# Patient Record
Sex: FEMALE | Race: WHITE
[De-identification: names, ages, dates, MRNs, and addresses within clinical notes are randomized per-mention and may not be internally consistent; named-entity substitution may affect disease eponyms.]

---

## 2017-05-28 ENCOUNTER — HOSPITAL ENCOUNTER (EMERGENCY)
Dept: HOSPITAL 33 - ED | Age: 77
Discharge: HOME | End: 2017-05-28
Payer: MEDICARE

## 2017-05-28 VITALS — DIASTOLIC BLOOD PRESSURE: 82 MMHG | HEART RATE: 89 BPM | SYSTOLIC BLOOD PRESSURE: 137 MMHG

## 2017-05-28 VITALS — OXYGEN SATURATION: 98 %

## 2017-05-28 DIAGNOSIS — Z79.899: ICD-10-CM

## 2017-05-28 DIAGNOSIS — R06.09: ICD-10-CM

## 2017-05-28 DIAGNOSIS — R05: ICD-10-CM

## 2017-05-28 DIAGNOSIS — J45.909: ICD-10-CM

## 2017-05-28 DIAGNOSIS — J40: Primary | ICD-10-CM

## 2017-05-28 DIAGNOSIS — R06.2: ICD-10-CM

## 2017-05-28 DIAGNOSIS — J43.9: ICD-10-CM

## 2017-05-28 PROCEDURE — 71020: CPT

## 2017-05-28 PROCEDURE — 99284 EMERGENCY DEPT VISIT MOD MDM: CPT

## 2017-05-28 NOTE — XRAY
Indication: Cough.



Comparison: May 15, 2016.



PA/lateral chest again hyperinflated with right base pleural thickening and

postsurgical changes in the right chest and epigastrium.  Remaining lungs

clear.  Heart is not enlarged.  Bony thorax intact again with mild osteopenia,

degenerative changes, and scoliosis.



Impression: Stable nonacute chest with chronic features.

## 2017-05-28 NOTE — ERPHSYRPT
- History of Present Illness


Time Seen by Provider: 05/28/17 14:10


Source: patient, family


Exam Limitations: no limitations


Patient Subjective Stated Complaint: PT REPORTS HAVING THE FLU THIS WEEK-STATES 

IT HAS WENT INTO HER LUNGS-REPORTS PRODUCTIVE COUGH WITH [employer]UM-REPORTS 

FEVER AT HOME A FEW DAYS AGO


Triage Nursing Assessment: PT PALE WARM ET DRY-ALERT-SPEAKING IN COMPLETE 

SENTENCES WITHE ASE-NO RETRACTIONS NOTED-PERSISTANT COUGH NOTED DURING TRIAGE-

LUNGS DIMINISHED


Physician History: 





The patient is 77-year-old female with family complaining that one week ago she 

had flulike symptoms that included a sore throat cough and fever.  Now for the 

last 2 days her cough has increased especially at night.  She states that every 

time she has a problem with a flulike illness it ends up settling in her lungs.

  Her past medical history is significant for asthma, COPD, hypertension, and 

diabetes.


Timing/Duration: week(s) (1)


Cough Quality/Degree: moderate, productive cough, sputum


Possible Cause: frequent episodes


Modifying Factors: Improves With: albuterol inhaler, albuterol nebulizer, 

coughing


Associated Symptoms: cough, shortness of breath


Allergies/Adverse Reactions: 








Iodinated Contrast Media - Oral and Allergy (Intermediate, Verified 05/28/17 13:

48)


 Hives


 hives, rash, diff breathing 


belladonna alkaloids [From B & O 16-A Supprette] Allergy (Mild, Verified 05/28/ 17 13:48)


 CHILD ALLERGY


cephalexin monohydrate [From Keflex] Allergy (Mild, Verified 05/28/17 13:48)


 Hives


furosemide [From Lasix] Allergy (Mild, Verified 05/28/17 13:48)


 HEART ARTHYM


prochlorperazine edisylate [From Compazine] Allergy (Mild, Verified 05/28/17 13:

48)


 HALLUCINATIONS


prochlorperazine maleate [From Compazine] Allergy (Mild, Verified 05/28/17 13:48

)


 HALLUCINATIONS


Sulfa (Sulfonamide Antibiotics) [Sulfa(Sulfonamide Antibiotics)] Allergy (Mild, 

Verified 05/28/17 13:48)


 Hives


codeine [Codeine] Adverse Reaction (Mild, Verified 05/28/17 13:48)


 Headache


hydrocodone bitartrate [From Vicodin] Adverse Reaction (Mild, Verified 05/28/17 

13:48)


 Headache


ketorolac tromethamine [From Toradol] Adverse Reaction (Mild, Verified 05/28/17 

13:48)


 Vomiting


metformin HCl [From Glucophage] Adverse Reaction (Mild, Verified 05/28/17 13:48)


 Vomiting


naproxen [From Naprosyn] Adverse Reaction (Mild, Verified 05/28/17 13:48)


 Headache


opium *RETIRED-04/24/13 [From B & O 16-A Supprette] Adverse Reaction (Mild, 

Verified 05/28/17 13:48)


 DON'T LIKE THEM


phenytoin sodium [From Dilantin] Adverse Reaction (Mild, Verified 05/28/17 13:48

)


 FEEL DRUNK


phenytoin sodium extended [From Dilantin] Adverse Reaction (Mild, Verified 05/28 /17 13:48)


 FEEL DRUNK


fluoxetine HCl [From Prozac] Adverse Reaction (Verified 05/28/17 13:48)


 


Lactobacillus acidophilus [From Acidophilus] Adverse Reaction (Verified 05/28/ 17 13:48)


 


diuretics Allergy (Intermediate, Uncoded 05/28/17 13:48)


 HEART ARRHYTHMIAS


 pt states she is allergic to all diuretics. pt has cardiac arrhythmias 


influenza vaccine Adverse Reaction (Uncoded 05/28/17 13:48)


 





Home Medications: 








Levothyroxine Sodium 50 Mcg*** [Synthroid 50 Mcg***] 25 mcg PO DAILY 07/22/12 [

History]


Lisinopril 5 mg PO DAILY 07/22/12 [History]


Omeprazole 20 MG [Prilosec 20 mg] 20 mg PO DAILY 07/22/12 [History]


Insulin Detemir [Levemir] 45 unit SQ 1900 09/07/12 [History]


Carvedilol 6.25 mg*** [Coreg 6.25 MG***] 12.5 mg PO BID 08/19/14 [History]


Potassium Chloride [K-Dur] 10 meq PO QID 08/19/14 [History]


Albuterol Sulfate [Proair Hfa] 0 gm IH UD 05/15/16 [History]





Hx Tetanus, Diphtheria Vaccination/Date Given: No


Hx Influenza Vaccination/Date Given: No


Hx Pneumococcal Vaccination/Date Given: No


Immunizations Up to Date: Yes





- Review of Systems


Constitutional: No Fever, No Chills


Eyes: No Symptoms


Ears, Nose, & Throat: No Symptoms


Respiratory: Cough, Dyspnea on Exertion (CLEMONS), Wheezing


Cardiac: No Chest Pain, No Edema, No Syncope


Abdominal/Gastrointestinal: No Abdominal Pain, No Nausea, No Vomiting, No 

Diarrhea


Genitourinary Symptoms: No Dysuria


Musculoskeletal: No Back Pain, No Neck Pain


Skin: No Rash


Neurological: No Dizziness, No Focal Weakness, No Sensory Changes


Psychological: No Symptoms


Endocrine: No Symptoms


Hematologic/Lymphatic: No Symptoms


Immunological/Allergic: No Symptoms


All Other Systems: Reviewed and Negative





- Past Medical History


Pertinent Past Medical History: Yes


Neurological History: No Pertinent History


ENT History: No Pertinent History


Cardiac History: Congestive Heart Failure, Hypertension


Respiratory History: Asthma, Bronchitis, Emphysema


Endocrine Medical History: Diabetes Type II, Hypothyroidism


Musculoskeletal History: Arthritis, Osteoarthritis


GI Medical History: GERD


 History: No Pertinent History


Psycho-Social History: Depression


Female Reproductive Disorders: Breast Cancer, Cervical Cancer


Other Medical History: CANCERS ALL IN REMISSION





- Past Surgical History


Past Surgical History: Yes


Neuro Surgical History: No Pertinent History


Cardiac: Cardiac Catheterization


Respiratory: Lobectomy


Gastrointestinal: Appendectomy, Cholecystectomy, Hemorrhoidectomy


Genitourinary: No Pertinent History


Musculoskeletal: Orthopedic Surgery


Female Surgical History: Hysterectomy, Tubal Ligation, Other


Other Surgical History: MASTECTOMY RT  BREAST,BREAST CA,LUNG RT MIDDLE LOBE 

REMOVED,NISSON FLUNDOPLASTY,KNEE BACL SURG, shoulder surgery





- Social History


Smoking Status: Former smoker


How long have you smoked: 5


Exposure to second hand smoke: No


Drug Use: none


Patient Lives Alone: Yes





- Female History


Hx Pregnant Now: No





- Nursing Vital Signs


Nursing Vital Signs: 


 Initial Vital Signs











Temperature                    97.7 F


 


Temperature Source             Oral


 


Pulse Rate                     89


 


Respiratory Rate               20


 


Blood Pressure [Left Arm]      137/82


 


Pain Intensity                 0

















- Physical Exam


General Appearance: no apparent distress, alert


Eye Exam: PERRL/EOMI, eyes nml inspection


Ears, Nose, Throat Exam: normal ENT inspection


Neck Exam: normal inspection, non-tender, supple, full range of motion


Respiratory Exam: normal breath sounds, lungs clear, No respiratory distress, 

No prolonged expirations, No rhonchi, No wheezing


Cardiovascular Exam: regular rate/rhythm, normal heart sounds


Gastrointestinal/Abdomen Exam: soft, No tenderness


Pelvic Exam: not done


Rectal Exam: not done


Back Exam: normal inspection, No CVA tenderness, No vertebral tenderness


Extremity Exam: normal inspection, normal range of motion


Neurologic Exam: alert, oriented x 3, cooperative, normal mood/affect, 

sensation nml, No motor deficits


Skin Exam: normal color, warm, dry, No rash


Lymphatic Exam: No adenopathy


**SpO2 Interpretation**: normal


SpO2: 98


Oxygen Delivery: Room Air





- Radiology Exams


  ** Chest


X-ray Interpretation: Interpreted by me, Negative (non acute CXR, unchanged 

comp CXR 5/15/16)


Ordered Tests: 


 Active Orders 24 hr











 Category Date Time Status


 


 CHEST 2 VIEWS (PA AND LAT) Stat Exams  05/28/17 14:14 Taken














- Progress


Progress: unchanged


Blood Culture(s) Obtained: No


Antibiotics given: Yes


Counseled pt/family regarding: diagnosis, rad results





- Departure


Time of Disposition: 15:01


Departure Disposition: Home


Clinical Impression: 


 Bronchitis





Condition: Stable


Critical Care Time: No


Additional Instructions: 


You have bronchitis.  The chest x-ray did not show pneumonia.  Take prednisone 

60 mg daily for 5 days.  Take levofloxacin 500 mg for 7 days.  Continue to take 

your breathing treatments as needed.  Follow-up in one to 2 days.


Prescriptions: 


Levofloxacin*** [Levofloxacin 500 MG Tablet***] 500 mg PO QAM #7 tablet


Prednisone 10 mg*** [Deltasone 10 mg***] 60 mg PO UD #30 tablet

## 2017-09-02 ENCOUNTER — HOSPITAL ENCOUNTER (EMERGENCY)
Dept: HOSPITAL 33 - ED | Age: 77
Discharge: HOME | End: 2017-09-02
Payer: MEDICARE

## 2017-09-02 VITALS — OXYGEN SATURATION: 98 % | HEART RATE: 68 BPM | SYSTOLIC BLOOD PRESSURE: 163 MMHG | DIASTOLIC BLOOD PRESSURE: 80 MMHG

## 2017-09-02 DIAGNOSIS — I10: Primary | ICD-10-CM

## 2017-09-02 LAB
ALBUMIN SERPL-MCNC: 3.1 G/DL (ref 3.4–5)
ALP SERPL-CCNC: 80 U/L (ref 46–116)
ALT SERPL-CCNC: 21 U/L (ref 12–78)
ANION GAP SERPL CALC-SCNC: 10.2 MEQ/L (ref 5–15)
AST SERPL QL: 13 U/L (ref 15–37)
BASOPHILS NFR BLD AUTO: 0.7 % (ref 0–0.4)
BILIRUB BLD-MCNC: 0.3 MG/DL (ref 0.2–1)
BUN SERPL-MCNC: 11 MG/DL (ref 9–20)
CHLORIDE SERPL-SCNC: 103 MEQ/L (ref 98–107)
CO2 SERPL-SCNC: 31.8 MEQ/L (ref 21–32)
GLUCOSE SERPL-MCNC: 209 MG/DL (ref 70–110)
MCH RBC QN AUTO: 28.7 PG (ref 26–32)
NEUTROPHILS NFR BLD AUTO: 45.5 % (ref 36–66)
PLATELET # BLD AUTO: 195 K/MM3 (ref 150–450)
POTASSIUM SERPLBLD-SCNC: 4 MEQ/L (ref 3.5–5.1)
PROT SERPL-MCNC: 6.2 GM/DL (ref 6.4–8.2)
RBC # BLD AUTO: 4.88 M/MM3 (ref 4.1–5.4)
SODIUM SERPL-SCNC: 141 MEQ/L (ref 136–145)
WBC # BLD AUTO: 7.3 K/MM3 (ref 4–10.5)

## 2017-09-02 PROCEDURE — 80053 COMPREHEN METABOLIC PANEL: CPT

## 2017-09-02 PROCEDURE — 99283 EMERGENCY DEPT VISIT LOW MDM: CPT

## 2017-09-02 PROCEDURE — 85025 COMPLETE CBC W/AUTO DIFF WBC: CPT

## 2017-09-02 PROCEDURE — 36415 COLL VENOUS BLD VENIPUNCTURE: CPT

## 2017-09-02 NOTE — ERPHSYRPT
- History of Present Illness


Time Seen by Provider: 09/02/17 15:39


Source: patient, family (son)


Patient Subjective Stated Complaint: PT REPORTS BP IS HIGH-STATES THAT DR SINCLAIR RECENTLY CHANGED HER BP MED-DENIES HEADACHE-DENIES NUMBNESS OR TINLGING-

REPORTS RECENT SURGERY TO SHOULDER


Triage Nursing Assessment: PT PINK WARM ET DRY-ALERT-PUPILS REACTIVE-ANSWERING 

ALL QUESTIONS CORRECTLY-NO UNUSUAL PAIN OR SENSATION


Physician History: 





CC: high blood pressure


Hx: 78 y/o patient of Dr Alfred/Rob/Larry. She had left shoulder 

surgery 10 days ago. Has done very well. She is taking ultracet and meperidine 

for post op pain as she has several other allergies. She noted higher BP 

yesterday. Called Dr Sinclair and was told to increase amlodipine from 2.5mg to 

5mg daily. She has had some headache. Some increased shoulder pain. No fever or 

chills. She called [facility] and was told to come to ER for BP check. No chest or abd 

pain. No swelling. No N/T/W.





Timing/Duration: yesterday


Severity: moderate


Allergies/Adverse Reactions: 








doxycycline Allergy (Intermediate, Verified 09/02/17 15:37)


 Swelling


Iodinated Contrast- Oral and IV Dye Allergy (Intermediate, Verified 09/02/17 15:

37)


 Hives


 hives, rash, diff breathing 


belladonna alkaloids [From B & O 16-A Supprette] Allergy (Mild, Verified 09/02/ 17 15:37)


 CHILD ALLERGY


cephalexin monohydrate [From Keflex] Allergy (Mild, Verified 09/02/17 15:37)


 Hives


furosemide [From Lasix] Allergy (Mild, Verified 09/02/17 15:37)


 HEART ARTHYM


prochlorperazine edisylate [From Compazine] Allergy (Mild, Verified 09/02/17 15:

37)


 HALLUCINATIONS


prochlorperazine maleate [From Compazine] Allergy (Mild, Verified 09/02/17 15:37

)


 HALLUCINATIONS


Sulfa (Sulfonamide Antibiotics) [Sulfa(Sulfonamide Antibiotics)] Allergy (Mild, 

Verified 09/02/17 15:37)


 Hives


codeine [Codeine] Adverse Reaction (Mild, Verified 09/02/17 15:37)


 Headache


hydrocodone bitartrate [From Vicodin] Adverse Reaction (Mild, Verified 09/02/17 

15:37)


 Headache


ketorolac tromethamine [From Toradol] Adverse Reaction (Mild, Verified 09/02/17 

15:37)


 Vomiting


metformin HCl [From Glucophage] Adverse Reaction (Mild, Verified 09/02/17 15:37)


 Vomiting


naproxen [From Naprosyn] Adverse Reaction (Mild, Verified 09/02/17 15:37)


 Headache


opium *RETIRED-04/24/13 [From B & O 16-A Supprette] Adverse Reaction (Mild, 

Verified 09/02/17 15:37)


 DON'T LIKE THEM


phenytoin sodium [From Dilantin] Adverse Reaction (Mild, Verified 09/02/17 15:37

)


 FEEL DRUNK


phenytoin sodium extended [From Dilantin] Adverse Reaction (Mild, Verified 09/02 /17 15:37)


 FEEL DRUNK


fluoxetine HCl [From Prozac] Adverse Reaction (Verified 09/02/17 15:37)


 


Lactobacillus acidophilus [From Acidophilus] Adverse Reaction (Verified 09/02/ 17 15:37)


 


diuretics Allergy (Intermediate, Uncoded 09/02/17 15:37)


 HEART ARRHYTHMIAS


 pt states she is allergic to all diuretics. pt has cardiac arrhythmias 


influenza vaccine Adverse Reaction (Uncoded 09/02/17 15:37)


 





Home Medications: 








Albuterol Sulfate [Ventolin Hfa] 8 gm IH UD 09/02/17 [History]


Amlodipine Besylate 5 mg*** [Norvasc 5 mg***] 5 mg PO DAILY 09/02/17 [History]


Carvedilol 12.5 mg*** [Coreg 12.5 mg***] 12.5 mg PO DAILY 09/02/17 [History]


Fluoxetine HCl 20 mg*** [Prozac 20 MG***] 20 mg PO DAILY 09/02/17 [History]


Fluticasone/Salmeterol [Advair 250-50 Diskus] 1 each IH BID 09/02/17 [History]


Glimepiride 2 mg*** [Amaryl 2 MG***] 2 mg PO DAILY 09/02/17 [History]


Insulin Detemir [Levemir] 45 unit SQ UD 09/02/17 [History]


Levothyroxine Sodium 25 Mcg*** [Synthroid 25 Mcg***] 25 mcg PO DAILY 09/02/17 [

History]


Lisinopril 10 mg*** [Zestril 10 MG***] 10 mg PO BID 09/02/17 [History]


Loratadine 10 mg*** [Claritin 10 mg***] 10 mg PO DAILY 09/02/17 [History]


Lutein 10 mg PO DAILY 09/02/17 [History]


Meperidine HCl 50 mg*** [Demerol 50 MG***] 50 mg PO UD 09/02/17 [History]


Metolazone 2.5 mg** [Zaroxolyn 2.5 MG**] 2.5 mg PO UD 09/02/17 [History]


Omeprazole 20 MG [Prilosec 20 mg] 20 mg PO DAILY 09/02/17 [History]


Potassium Chloride 10 Meq Tab* [Klor Con 10 MEQ***] 10 meq PO BID 09/02/17 [

History]


Tramadol HCl/Acetaminophen [Tramadol-Acetaminophn 37.5-325] 1 each PO UD 09/02/ 17 [History]





Hx Tetanus, Diphtheria Vaccination/Date Given: No


Hx Influenza Vaccination/Date Given: No


Hx Pneumococcal Vaccination/Date Given: No


Immunizations Up to Date: Yes





- Review of Systems


Constitutional: No Fever, No Chills


Eyes: No Symptoms


Ears, Nose, & Throat: No Symptoms


Respiratory: No Cough, No Dyspnea


Cardiac: No Chest Pain


Abdominal/Gastrointestinal: No Abdominal Pain, No Nausea, No Vomiting


Skin: No Rash


Neurological: Headache, No Focal Weakness, No Parasthesia


All Other Systems: Reviewed and Negative





- Past Medical History


Pertinent Past Medical History: Yes


Neurological History: No Pertinent History


ENT History: No Pertinent History


Cardiac History: Congestive Heart Failure, Hypertension


Respiratory History: Asthma, Bronchitis, Emphysema


Endocrine Medical History: Diabetes Type II, Hypothyroidism


Musculoskeletal History: Arthritis, Osteoarthritis


GI Medical History: GERD


 History: No Pertinent History


Psycho-Social History: Depression


Female Reproductive Disorders: Breast Cancer, Cervical Cancer


Other Medical History: CANCERS ALL IN REMISSION





- Past Surgical History


Past Surgical History: Yes


Neuro Surgical History: No Pertinent History


Cardiac: Cardiac Catheterization


Respiratory: Lobectomy


Gastrointestinal: Appendectomy, Cholecystectomy, Hemorrhoidectomy


Genitourinary: No Pertinent History


Musculoskeletal: Orthopedic Surgery


Female Surgical History: Hysterectomy, Tubal Ligation, Other


Other Surgical History: MASTECTOMY RT  BREAST,BREAST CA,LUNG RT MIDDLE LOBE 

REMOVED,NISSON FLUNDOPLASTY,KNEE BACL SURG, shoulder surgery





- Social History


Smoking Status: Former smoker


How long have you smoked: 5


Exposure to second hand smoke: No


Drug Use: none


Patient Lives Alone: Yes





- Female History


Hx Pregnant Now: No





- Nursing Vital Signs


Nursing Vital Signs: 


 Initial Vital Signs











Temperature  97.2 F   09/02/17 15:38


 


Pulse Rate  69   09/02/17 15:38


 


Respiratory Rate  18   09/02/17 15:38


 


Blood Pressure  188/82   09/02/17 15:38


 


O2 Sat by Pulse Oximetry  96   09/02/17 15:38








 Pain Scale











Pain Intensity                 7

















- Physical Exam


General Appearance: alert


Eye Exam: PERRL/EOMI


Ears, Nose, Throat Exam: moist mucous membranes


Neck Exam: normal inspection, non-tender, supple


Respiratory Exam: normal breath sounds


Cardiovascular Exam: regular rate/rhythm


Gastrointestinal/Abdomen Exam: soft, No tenderness, No distention


Extremity Exam: normal inspection, other (left shoulder has some bruising but 

appears well without erythema.)


Neurologic Exam: alert, oriented x 3, cooperative


Skin Exam: warm, dry


**SpO2 Interpretation**: normal


SpO2: 96


Oxygen Delivery: Room Air





- Course


Nursing assessment & vital signs reviewed: Yes


Ordered Tests: 


 Active Orders 24 hr











 Category Date Time Status


 


 CBC W DIFF Stat Lab  09/02/17 16:06 Completed


 


 CMP Stat Lab  09/02/17 16:06 Completed








Medication Summary














Discontinued Medications














Generic Name Dose Route Start Last Admin





  Trade Name Freq  PRN Reason Stop Dose Admin


 


Hydroxyzine HCl  25 mg  09/02/17 15:51  09/02/17 15:55





  Atarax 25 Mg***  PO  09/02/17 15:52  25 mg





  STAT ONE   Administration


 


Hydroxyzine HCl  Confirm  09/02/17 15:54  





  Atarax 25 Mg***  Administered  09/02/17 15:55  





  Dose   





  25 mg   





  .ROUTE   





  .STK-MED ONE   











Lab/Rad Data: 


 Laboratory Result Diagrams





 09/02/17 16:06 





 09/02/17 16:06 





 Laboratory Results











  09/02/17 09/02/17 Range/Units





  16:06 16:06 


 


WBC   7.3  (4.0-10.5)  K/mm3


 


RBC   4.88  (4.1-5.4)  M/mm3


 


Hgb   14.0  (12.0-16.0)  gm/dl


 


Hct   43.0  (35-47)  %


 


MCV   88.1  ()  fl


 


MCH   28.7  (26-32)  pg


 


MCHC   32.6  (32-36)  g/dl


 


RDW   14.2 H  (11.5-14.0)  %


 


Plt Count   195  (150-450)  K/mm3


 


MPV   11.4 H  (6-9.5)  fl


 


Gran %   45.5  (36.0-66.0)  %


 


Lymphocytes %   42.7  (24.0-44.0)  %


 


Monocytes %   7.0  (0.0-12.0)  %


 


Eosinophils %   4.1  (0.00-5.0)  %


 


Basophils %   0.7  (0.0-0.4)  %


 


Basophils #   0.05  (0-0.4)  


 


Sodium  141   (136-145)  mEq/L


 


Potassium  4.0   (3.5-5.1)  mEq/L


 


Chloride  103   ()  mEq/L


 


Carbon Dioxide  31.8   (21-32)  mEq/L


 


Anion Gap  10.2   (5-15)  MEQ/L


 


BUN  11   (9-20)  mg/dL


 


Creatinine  0.93   (0.55-1.30)  mg/dl


 


Estimated GFR  > 60   ML/MIN


 


Glucose  209 H   ()  MG/DL


 


Calcium  9.1   (8.5-10.1)  mg/dL


 


Total Bilirubin  0.30   (0.2-1.0)  mg/dL


 


AST  13 L   (15-37)  U/L


 


ALT  21   (12-78)  U/L


 


Alkaline Phosphatase  80   ()  U/L


 


Serum Total Protein  6.2 L   (6.4-8.2)  gm/dL


 


Albumin  3.1 L   (3.4-5.0)  g/dL














- Progress


Progress Note: 





09/02/17 16:39


BP improving. Creat wnl. She feels about same or some better. Will release with 

instr.





Counseled pt/family regarding: lab results, diagnosis, need for follow-up





- Departure


Time of Disposition: 16:40


Departure Disposition: Home


Clinical Impression: 


 Hypertension





Condition: Stable


Critical Care Time: No


Referrals: 


CLAUDIA BERGMAN [Primary Care Provider] - 


Instructions:  High Blood Pressure


Additional Instructions: 


Continue increased dose of norvasc of 5mg daily.


No driving today.


Follow up next week with Dr Sinclair/Tima.


Return for problems or concerns.

## 2017-09-30 ENCOUNTER — HOSPITAL ENCOUNTER (EMERGENCY)
Dept: HOSPITAL 33 - ED | Age: 77
Discharge: HOME | End: 2017-09-30
Payer: MEDICARE

## 2017-09-30 VITALS — HEART RATE: 61 BPM | SYSTOLIC BLOOD PRESSURE: 143 MMHG | DIASTOLIC BLOOD PRESSURE: 66 MMHG

## 2017-09-30 VITALS — OXYGEN SATURATION: 97 %

## 2017-09-30 DIAGNOSIS — I10: Primary | ICD-10-CM

## 2017-09-30 DIAGNOSIS — E11.9: ICD-10-CM

## 2017-09-30 LAB
ANION GAP SERPL CALC-SCNC: 10.6 MEQ/L (ref 5–15)
BACTERIA UR CULT: NO
BUN SERPL-MCNC: 10 MG/DL (ref 9–20)
CHLORIDE SERPL-SCNC: 105 MEQ/L (ref 98–107)
CO2 SERPL-SCNC: 28.1 MEQ/L (ref 21–32)
COLLECTION TYPE: (no result)
COMPLETE URINE MICROSCOPIC?: NO
GLUCOSE SERPL-MCNC: 185 MG/DL (ref 70–110)
GLUCOSE UR-MCNC: NEGATIVE MG/DL
POTASSIUM SERPLBLD-SCNC: 3.7 MEQ/L (ref 3.5–5.1)
SODIUM SERPL-SCNC: 140 MEQ/L (ref 136–145)

## 2017-09-30 PROCEDURE — 36415 COLL VENOUS BLD VENIPUNCTURE: CPT

## 2017-09-30 PROCEDURE — 80048 BASIC METABOLIC PNL TOTAL CA: CPT

## 2017-09-30 PROCEDURE — 81002 URINALYSIS NONAUTO W/O SCOPE: CPT

## 2017-09-30 PROCEDURE — 99282 EMERGENCY DEPT VISIT SF MDM: CPT

## 2017-09-30 NOTE — ERPHSYRPT
- History of Present Illness


Time Seen by Provider: 09/30/17 17:01


Source: patient, family (son)


Patient Subjective Stated Complaint: PT REPORTS HTN TODAY-STATES SHE HAD A BP 

/127 WITH A WRIST MONITOR-REPORTS HEADACHE UNSURE OF WHEN IT BEGAN-DENIES 

NUMBNESS OR TINLGING


Triage Nursing Assessment: PT PALE WARM ET DRY-ALERT-ABLE TO MOVE ALL 

EXTREMITIES EXCEPT LEFT SHOULDER DUE TO SURGERY ON AUG 22-RESP EASY ET 

NONLABORED-PUPILS REACTIVE


Physician History: 





CC: high blood pressure


Hx: 76 y/o patient of Dr Baca/Larry with hx of HTN and diabetes. She had 

recent shoulder surgery and has been recuperating. The pillow splint is now off 

and she is doing better and only uses pain medication at night for sleep 

occasionally. She had low blood sugar two nights ago self treated with orange 

juice. Dr Sinclair has been adjusting her medications for BP. She had some 

swelling with norvasc. Today the BP was elevated 188 systolic by her wrist 

cuff. She check a few times and was concerned. She had a mild headache and felt 

woozy today. No focal weakness, numbness, chest pain, vomiting or other 

concerns.





Timing/Duration: today


Severity: mild


Allergies/Adverse Reactions: 








doxycycline Allergy (Intermediate, Verified 09/30/17 17:09)


 Swelling


Iodinated Contrast- Oral and IV Dye Allergy (Intermediate, Verified 09/30/17 17:

09)


 Hives


 hives, rash, diff breathing 


belladonna alkaloids [From B & O 16-A Supprette] Allergy (Mild, Verified 09/30/ 17 17:09)


 CHILD ALLERGY


cephalexin monohydrate [From Keflex] Allergy (Mild, Verified 09/30/17 17:09)


 Hives


furosemide [From Lasix] Allergy (Mild, Verified 09/30/17 17:09)


 HEART ARTHYM


prochlorperazine edisylate [From Compazine] Allergy (Mild, Verified 09/30/17 17:

09)


 HALLUCINATIONS


prochlorperazine maleate [From Compazine] Allergy (Mild, Verified 09/30/17 17:09

)


 HALLUCINATIONS


Sulfa (Sulfonamide Antibiotics) [Sulfa(Sulfonamide Antibiotics)] Allergy (Mild, 

Verified 09/30/17 17:09)


 Hives


codeine [Codeine] Adverse Reaction (Mild, Verified 09/30/17 17:09)


 Headache


hydrocodone bitartrate [From Vicodin] Adverse Reaction (Mild, Verified 09/30/17 

17:09)


 Headache


ketorolac tromethamine [From Toradol] Adverse Reaction (Mild, Verified 09/30/17 

17:09)


 Vomiting


metformin HCl [From Glucophage] Adverse Reaction (Mild, Verified 09/30/17 17:09)


 Vomiting


naproxen [From Naprosyn] Adverse Reaction (Mild, Verified 09/30/17 17:09)


 Headache


opium *RETIRED-04/24/13 [From B & O 16-A Supprette] Adverse Reaction (Mild, 

Verified 09/30/17 17:09)


 DON'T LIKE THEM


phenytoin sodium [From Dilantin] Adverse Reaction (Mild, Verified 09/30/17 17:09

)


 FEEL DRUNK


phenytoin sodium extended [From Dilantin] Adverse Reaction (Mild, Verified 09/30 /17 17:09)


 FEEL DRUNK


fluoxetine HCl [From Prozac] Adverse Reaction (Verified 09/30/17 17:09)


 


Lactobacillus acidophilus [From Acidophilus] Adverse Reaction (Verified 09/30/ 17 17:09)


 


diuretics Allergy (Intermediate, Uncoded 09/30/17 17:09)


 HEART ARRHYTHMIAS


 pt states she is allergic to all diuretics. pt has cardiac arrhythmias 


influenza vaccine Adverse Reaction (Uncoded 09/30/17 17:09)


 





Home Medications: 








Albuterol Sulfate [Ventolin Hfa] 8 gm IH UD 09/02/17 [History]


Amlodipine Besylate 5 mg*** [Norvasc 5 mg***] 5 mg PO DAILY 09/02/17 [History]


Carvedilol 12.5 mg*** [Coreg 12.5 mg***] 12.5 mg PO DAILY 09/02/17 [History]


Fluoxetine HCl 20 mg*** [Prozac 20 MG***] 20 mg PO DAILY 09/02/17 [History]


Fluticasone/Salmeterol [Advair 250-50 Diskus] 1 each IH BID 09/02/17 [History]


Glimepiride 2 mg*** [Amaryl 2 MG***] 2 mg PO DAILY 09/02/17 [History]


Insulin Detemir [Levemir] 45 unit SQ UD 09/02/17 [History]


Levothyroxine Sodium 25 Mcg*** [Synthroid 25 Mcg***] 25 mcg PO DAILY 09/02/17 [

History]


Lisinopril 10 mg*** [Zestril 10 MG***] 10 mg PO BID 09/02/17 [History]


Loratadine 10 mg*** [Claritin 10 mg***] 10 mg PO DAILY 09/02/17 [History]


Lutein 10 mg PO DAILY 09/02/17 [History]


Metolazone 2.5 mg** [Zaroxolyn 2.5 MG**] 2.5 mg PO UD 09/02/17 [History]


Omeprazole 20 MG [Prilosec 20 mg] 20 mg PO DAILY 09/02/17 [History]


Potassium Chloride 10 Meq Tab* [Klor Con 10 MEQ***] 10 meq PO BID 09/02/17 [

History]


Tramadol HCl/Acetaminophen [Tramadol-Acetaminophn 37.5-325] 1 each PO UD 09/02/ 17 [History]





Hx Tetanus, Diphtheria Vaccination/Date Given: No


Hx Influenza Vaccination/Date Given: No


Hx Pneumococcal Vaccination/Date Given: No


Immunizations Up to Date: Yes





- Review of Systems


Constitutional: Malaise, No Fever, No Chills


Eyes: No Symptoms, No Vision Changes


Ears, Nose, & Throat: No Symptoms


Respiratory: No Cough, No Dyspnea


Cardiac: Edema, No Chest Pain, No Syncope


Abdominal/Gastrointestinal: No Abdominal Pain, No Nausea, No Vomiting


Skin: No Rash


Neurological: Headache, No Dizziness, No Focal Weakness, No Parasthesia


All Other Systems: Reviewed and Negative





- Past Medical History


Pertinent Past Medical History: Yes


Neurological History: No Pertinent History


ENT History: No Pertinent History


Cardiac History: Congestive Heart Failure, Hypertension


Respiratory History: Asthma, Bronchitis, Emphysema


Endocrine Medical History: Diabetes Type II, Hypothyroidism


Musculoskeletal History: Arthritis, Osteoarthritis


GI Medical History: GERD


 History: No Pertinent History


Psycho-Social History: Depression


Female Reproductive Disorders: Breast Cancer, Cervical Cancer


Other Medical History: CANCERS ALL IN REMISSION





- Past Surgical History


Past Surgical History: Yes


Neuro Surgical History: No Pertinent History


Cardiac: Cardiac Catheterization


Respiratory: Lobectomy


Gastrointestinal: Appendectomy, Cholecystectomy, Hemorrhoidectomy


Genitourinary: No Pertinent History


Musculoskeletal: Orthopedic Surgery


Female Surgical History: Hysterectomy, Tubal Ligation, Other


Other Surgical History: MASTECTOMY RT  BREAST,BREAST CA,LUNG RT MIDDLE LOBE 

REMOVED,NISSON FLUNDOPLASTY,KNEE BACL SURG, shoulder surgery





- Social History


Smoking Status: Former smoker


How long have you smoked: 5


Exposure to second hand smoke: No


Drug Use: none


Patient Lives Alone: Yes





- Female History


Hx Pregnant Now: No





- Nursing Vital Signs


Nursing Vital Signs: 


 Initial Vital Signs











Temperature  98.5 F   09/30/17 17:05


 


Pulse Rate  67   09/30/17 17:05


 


Respiratory Rate  20   09/30/17 17:05


 


Blood Pressure  153/71   09/30/17 17:05


 


O2 Sat by Pulse Oximetry  97   09/30/17 17:05








 Pain Scale











Pain Intensity                 4

















- Physical Exam


General Appearance: alert, other (pleasant lady accompanied by her son)


Eye Exam: PERRL/EOMI


Ears, Nose, Throat Exam: normal ENT inspection, moist mucous membranes


Neck Exam: normal inspection, supple


Respiratory Exam: normal breath sounds


Cardiovascular Exam: regular rate/rhythm


Gastrointestinal/Abdomen Exam: soft, No tenderness, No distention


Extremity Exam: pedal edema (1-2 + both legs/ankles), other (sling on left arm)


Neurologic Exam: alert, oriented x 3, CNs II-XII nml as tested, sensation nml, 

No motor deficits


Skin Exam: warm, dry, No rash


**SpO2 Interpretation**: normal


SpO2: 97


Oxygen Delivery: Room Air





- Course


Nursing assessment & vital signs reviewed: Yes


Ordered Tests: 


 Active Orders 24 hr











 Category Date Time Status


 


 Clean Catch Urine Specimen STAT Care  09/30/17 17:25 Active


 


 BMP Stat Lab  09/30/17 17:40 Completed


 


 UA W/RFX UR CULTURE Stat Lab  09/30/17 17:30 Completed











Lab/Rad Data: 


 Laboratory Result Diagrams





 09/30/17 17:40 





 Laboratory Results











  09/30/17 09/30/17 Range/Units





  17:40 17:30 


 


Sodium  140   (136-145)  mEq/L


 


Potassium  3.7   (3.5-5.1)  mEq/L


 


Chloride  105   ()  mEq/L


 


Carbon Dioxide  28.1   (21-32)  mEq/L


 


Anion Gap  10.6   (5-15)  MEQ/L


 


BUN  10   (9-20)  mg/dL


 


Creatinine  0.84   (0.55-1.30)  mg/dl


 


Estimated GFR  > 60   ML/MIN


 


Glucose  185 H   ()  MG/DL


 


Calcium  8.6   (8.5-10.1)  mg/dL


 


Ur Collection Type   CLEAN CATCH  


 


Urine Color   YELLOW  (YELLOW)  


 


Urine Appearance   CLEAR  (CLEAR)  


 


Urine pH   5.0  (5-6)  


 


Ur Specific Gravity   1.015  (1.005-1.025)  


 


Urine Protein   NEGATIVE  (Negative)  


 


Urine Ketones   NEGATIVE  (NEGATIVE)  


 


Urine Blood   NEGATIVE  (0-5)  William/ul


 


Urine Nitrite   NEGATIVE  (NEGATIVE)  


 


Urine Bilirubin   NEGATIVE  (NEGATIVE)  


 


Urine Urobilinogen   NORMAL  (0-1)  mg/dL


 


Ur Leukocyte Esterase   NEGATIVE  (NEGATIVE)  


 


Urine Glucose   NEGATIVE  (NEGATIVE)  mg/dL


 


Specimen Received   09/30/17:1730  














- Progress


Progress Note: 





09/30/17 17:35


She is concerned about blood pressure. She has mild vague symptoms. Nonfocal 

neuro exam. Son is concerned about having adequate blood pressure machine at 

home. Current BP is 154/60 on two checks.


09/30/17 18:20


/69.  UA and BMP reassuring. Paged Dr Sinclair at patient's request as she 

already spoke to him today.





09/30/17 18:54


Spoke to Dr Sinclair. He advised medication same and follow up in office this 

week. Will release with instr. Son is going to take the wrist machine to get it 

correlated or calibrated.


Counseled pt/family regarding: lab results, diagnosis, need for follow-up





- Departure


Time of Disposition: 18:54


Departure Disposition: Home


Clinical Impression: 


Hypertension


Qualifiers:


 Hypertension type: essential hypertension Qualified Code(s): I10 - Essential (

primary) hypertension





Condition: Stable


Critical Care Time: No


Referrals: 


NANCY BACA MD [Primary Care Provider] - 


GREGORY SINCLAIR [ACTIVE STAFF] - 


Instructions:  High Blood Pressure


Additional Instructions: 


Take your normal medications.


Call Dr Sinclair Monday to be seen this week.


Check blood pressure twice a day and record for doctor.


Return for problems or concerns.

## 2018-02-12 ENCOUNTER — HOSPITAL ENCOUNTER (OUTPATIENT)
Dept: HOSPITAL 33 - MED SURG | Age: 78
Setting detail: OBSERVATION
LOS: 1 days | Discharge: ANOTHER HOSPITAL | End: 2018-02-13
Attending: INTERNAL MEDICINE | Admitting: INTERNAL MEDICINE
Payer: MEDICARE

## 2018-02-12 DIAGNOSIS — J40: ICD-10-CM

## 2018-02-12 DIAGNOSIS — M19.90: ICD-10-CM

## 2018-02-12 DIAGNOSIS — Z87.891: ICD-10-CM

## 2018-02-12 DIAGNOSIS — E78.00: ICD-10-CM

## 2018-02-12 DIAGNOSIS — E11.9: ICD-10-CM

## 2018-02-12 DIAGNOSIS — R91.8: ICD-10-CM

## 2018-02-12 DIAGNOSIS — Z79.899: ICD-10-CM

## 2018-02-12 DIAGNOSIS — F32.9: ICD-10-CM

## 2018-02-12 DIAGNOSIS — G47.30: ICD-10-CM

## 2018-02-12 DIAGNOSIS — J44.1: ICD-10-CM

## 2018-02-12 DIAGNOSIS — J18.9: Primary | ICD-10-CM

## 2018-02-12 LAB
ALBUMIN SERPL-MCNC: 3.2 G/DL (ref 3.4–5)
ALP SERPL-CCNC: 60 U/L (ref 46–116)
ALT SERPL-CCNC: 14 U/L (ref 12–78)
ANION GAP SERPL CALC-SCNC: 12.2 MEQ/L (ref 5–15)
AST SERPL QL: 12 U/L (ref 15–37)
BILIRUB BLD-MCNC: 0.4 MG/DL (ref 0.2–1)
BUN SERPL-MCNC: 15 MG/DL (ref 9–20)
CALCIUM SPEC-MCNC: 8.8 MG/DL (ref 8.5–10.1)
CHLORIDE SERPL-SCNC: 104 MEQ/L (ref 98–107)
CO2 SERPL-SCNC: 26.9 MEQ/L (ref 21–32)
CREAT SERPL-MCNC: 0.91 MG/DL (ref 0.55–1.3)
FLUAV AG NPH QL IA: NEGATIVE
FLUBV AG NPH QL IA: NEGATIVE
GFR SERPLBLD BASED ON 1.73 SQ M-ARVRAT: > 60 ML/MIN
GLUCOSE SERPL-MCNC: 162 MG/DL (ref 70–110)
HCT VFR BLD AUTO: 44.8 % (ref 35–47)
HGB BLD-MCNC: 14.7 GM/DL (ref 12–16)
MCH RBC QN AUTO: 28.8 PG (ref 26–32)
MCHC RBC AUTO-ENTMCNC: 32.8 G/DL (ref 32–36)
PLATELET # BLD AUTO: 165 K/MM3 (ref 150–450)
POTASSIUM SERPLBLD-SCNC: 3.8 MEQ/L (ref 3.5–5.1)
PROT SERPL-MCNC: 6.5 GM/DL (ref 6.4–8.2)
RBC # BLD AUTO: 5.11 M/MM3 (ref 4.1–5.4)
RSV AG SPEC QL IA: NEGATIVE
SODIUM SERPL-SCNC: 139 MEQ/L (ref 136–145)
WBC # BLD AUTO: 9.2 K/MM3 (ref 4–10.5)

## 2018-02-12 PROCEDURE — G0378 HOSPITAL OBSERVATION PER HR: HCPCS

## 2018-02-12 PROCEDURE — 83036 HEMOGLOBIN GLYCOSYLATED A1C: CPT

## 2018-02-12 PROCEDURE — 94150 VITAL CAPACITY TEST: CPT

## 2018-02-12 PROCEDURE — 94760 N-INVAS EAR/PLS OXIMETRY 1: CPT

## 2018-02-12 PROCEDURE — 87040 BLOOD CULTURE FOR BACTERIA: CPT

## 2018-02-12 PROCEDURE — 85027 COMPLETE CBC AUTOMATED: CPT

## 2018-02-12 PROCEDURE — 80053 COMPREHEN METABOLIC PANEL: CPT

## 2018-02-12 PROCEDURE — 71046 X-RAY EXAM CHEST 2 VIEWS: CPT

## 2018-02-12 PROCEDURE — 36415 COLL VENOUS BLD VENIPUNCTURE: CPT

## 2018-02-12 PROCEDURE — 94640 AIRWAY INHALATION TREATMENT: CPT

## 2018-02-12 PROCEDURE — 87631 RESP VIRUS 3-5 TARGETS: CPT

## 2018-02-12 PROCEDURE — 82962 GLUCOSE BLOOD TEST: CPT

## 2018-02-12 PROCEDURE — 71250 CT THORAX DX C-: CPT

## 2018-02-12 RX ADMIN — FLUTICASONE PROPIONATE AND SALMETEROL XINAFOATE SCH PUFF: 115; 21 AEROSOL, METERED RESPIRATORY (INHALATION) at 20:14

## 2018-02-12 RX ADMIN — IPRATROPIUM BROMIDE AND ALBUTEROL SULFATE SCH ML: .5; 3 SOLUTION RESPIRATORY (INHALATION) at 23:17

## 2018-02-12 RX ADMIN — POTASSIUM CHLORIDE SCH MEQ: 10 TABLET, EXTENDED RELEASE ORAL at 21:16

## 2018-02-12 RX ADMIN — IPRATROPIUM BROMIDE AND ALBUTEROL SULFATE SCH ML: .5; 3 SOLUTION RESPIRATORY (INHALATION) at 20:09

## 2018-02-12 RX ADMIN — CARVEDILOL SCH MG: 12.5 TABLET, FILM COATED ORAL at 21:17

## 2018-02-12 NOTE — XRAY
Indication: COPD exacerbation.



Comparison: May 28, 2017.



PA/lateral chest again hyperinflated with right costophrenic blunting.  New

right middle lobe infiltrate versus atelectasis.  Also new left apical 2 cm

nodularity and possibly similar right base 2.5 cm nodularity.  Heart is not

enlarged.  Bony thorax intact again with mild degenerative changes and

scoliosis.  Stable right axillary nick dissection, right breast surgical

clips, and epigastric surgical clips.



Impression:

1.  New right middle lobe infiltrate/atelectasis.  Correlate clinically.

2.  New left apical and possibly right base nodularities that can be better

evaluated with CT.

## 2018-02-13 VITALS — OXYGEN SATURATION: 96 % | HEART RATE: 78 BPM | SYSTOLIC BLOOD PRESSURE: 140 MMHG | DIASTOLIC BLOOD PRESSURE: 68 MMHG

## 2018-02-13 RX ADMIN — IPRATROPIUM BROMIDE AND ALBUTEROL SULFATE SCH ML: .5; 3 SOLUTION RESPIRATORY (INHALATION) at 07:30

## 2018-02-13 RX ADMIN — IPRATROPIUM BROMIDE AND ALBUTEROL SULFATE SCH ML: .5; 3 SOLUTION RESPIRATORY (INHALATION) at 10:37

## 2018-02-13 RX ADMIN — IPRATROPIUM BROMIDE AND ALBUTEROL SULFATE SCH ML: .5; 3 SOLUTION RESPIRATORY (INHALATION) at 03:21

## 2018-02-13 RX ADMIN — FLUTICASONE PROPIONATE AND SALMETEROL XINAFOATE SCH PUFF: 115; 21 AEROSOL, METERED RESPIRATORY (INHALATION) at 07:30

## 2018-02-13 RX ADMIN — POTASSIUM CHLORIDE SCH MEQ: 10 TABLET, EXTENDED RELEASE ORAL at 09:56

## 2018-02-13 RX ADMIN — CARVEDILOL SCH MG: 12.5 TABLET, FILM COATED ORAL at 09:57

## 2018-02-13 NOTE — XRAY
Indication: Lung nodules on same-day chest radiograph.  History right breast

cancer and cervical cancer.



Multiple contiguous axial images obtained through the chest without contrast

as ordered.



Comparison: June 17, 2016.



New 2.3 cm noncalcified lobular mass in the left upper lobe with tiny

surrounding micronodules and 2.5 cm noncalcified mass in the right posterior

gutter all worrisome for metastasis.  Stable right midlung postsurgical

changes with minimal pleural calcifications and tiny left lower lobe calcified

granuloma.  Right midlung demonstrates new focus of minimal subsegmental

atelectasis/scarring secondary to new right hilar 3.4 x 3.8 cm matted

lymphadenopathy.  Also new 1.5 x 2.7 cm subcarinal lymphadenopathy.  No

effusion.



Heart is not enlarged.  No pericardial effusion.  Aorta remains minimally

arteriosclerotic without aneurysmal dilatation.  Stable bilateral hilar

calcified nodes.



Bony thorax intact again with mild scoliosis, right axillary nick dissection,

and right breast surgical clips.



Limited upper abdomen again demonstrates epigastric surgical clips,

cholecystectomy clips, fatty liver, calcified splenic granulomas, and 13 cm

splenomegaly.



Impression:

1.  New left upper lobe/right base pulmonary masses and mediastinal/right

hilar lymphadenopathy worrisome for metastasis.

2.  Stable postsurgical changes, fatty liver, splenomegaly, and evidence for

old granulomatous disease.



CTDI 17.75

## 2018-02-13 NOTE — PCM.DS
Discharge Summary


Date of Admission: 


02/12/18 16:16





Admitting Physician: 


NANCY BACA





Primary Care Provider: 


NANCY BACA








Allergies


Allergies





doxycycline Allergy (Intermediate, Verified 09/30/17 17:09)


 Swelling


Iodinated Contrast- Oral and IV Dye Allergy (Intermediate, Verified 09/30/17 17:

09)


 Hives


 hives, rash, diff breathing 


belladonna alkaloids [From B & O 16-A Supprette] Allergy (Mild, Verified 09/30/ 17 17:09)


 CHILD ALLERGY


cephalexin monohydrate [From Keflex] Allergy (Mild, Verified 09/30/17 17:09)


 Hives


furosemide [From Lasix] Allergy (Mild, Verified 09/30/17 17:09)


 HEART ARTHYM


prochlorperazine edisylate [From Compazine] Allergy (Mild, Verified 09/30/17 17:

09)


 HALLUCINATIONS


prochlorperazine maleate [From Compazine] Allergy (Mild, Verified 09/30/17 17:09

)


 HALLUCINATIONS


Sulfa (Sulfonamide Antibiotics) [Sulfa(Sulfonamide Antibiotics)] Allergy (Mild, 

Verified 09/30/17 17:09)


 Hives


codeine [Codeine] Adverse Reaction (Mild, Verified 09/30/17 17:09)


 Headache


hydrocodone bitartrate [From Vicodin] Adverse Reaction (Mild, Verified 09/30/17 

17:09)


 Headache


ketorolac tromethamine [From Toradol] Adverse Reaction (Mild, Verified 09/30/17 

17:09)


 Vomiting


metformin HCl [From Glucophage] Adverse Reaction (Mild, Verified 09/30/17 17:09)


 Vomiting


naproxen [From Naprosyn] Adverse Reaction (Mild, Verified 09/30/17 17:09)


 Headache


opium *RETIRED-04/24/13 [From B & O 16-A Supprette] Adverse Reaction (Mild, 

Verified 09/30/17 17:09)


 DON'T LIKE THEM


phenytoin sodium [From Dilantin] Adverse Reaction (Mild, Verified 09/30/17 17:09

)


 FEEL DRUNK


phenytoin sodium extended [From Dilantin] Adverse Reaction (Mild, Verified 09/30 /17 17:09)


 FEEL DRUNK


Lactobacillus acidophilus [From Acidophilus] Adverse Reaction (Verified 09/30/ 17 17:09)


 


diuretics Allergy (Intermediate, Uncoded 09/30/17 17:09)


 HEART ARRHYTHMIAS


 pt states she is allergic to all diuretics. pt has cardiac arrhythmias 


influenza vaccine Adverse Reaction (Uncoded 09/30/17 17:09)


 











Hospital Summary





- Hospital Course


Hospital Course: 








 Chief Complaint





Diagnosis                        Pneumonia,ae copd





 Allergies











Allergy/AdvReac Type Severity Reaction Status Date / Time


 


doxycycline Allergy Intermediate Swelling Verified 09/30/17 17:09


 


Iodinated Contrast- Oral and Allergy Intermediate Hives Verified 09/30/17 17:09





IV Dye     


 


belladonna alkaloids Allergy Mild CHILD Verified 09/30/17 17:09





[From B & O 16-A Supprette]   ALLERGY  


 


cephalexin monohydrate Allergy Mild Hives Verified 09/30/17 17:09





[From Keflex]     


 


furosemide [From Lasix] Allergy Mild HEART Verified 09/30/17 17:09





   ARTHYM  


 


prochlorperazine edisylate Allergy Mild HALLUCINATI Verified 09/30/17 17:09





[From Compazine]   ONS  


 


prochlorperazine maleate Allergy Mild HALLUCINATI Verified 09/30/17 17:09





[From Compazine]   ONS  


 


Sulfa (Sulfonamide Allergy Mild Hives Verified 09/30/17 17:09





Antibiotics)     





[Sulfa(Sulfonamide     





Antibiotics)]     


 


codeine [Codeine] AdvReac Mild Headache Verified 09/30/17 17:09


 


hydrocodone bitartrate AdvReac Mild Headache Verified 09/30/17 17:09





[From Vicodin]     


 


ketorolac tromethamine AdvReac Mild Vomiting Verified 09/30/17 17:09





[From Toradol]     


 


metformin HCl AdvReac Mild Vomiting Verified 09/30/17 17:09





[From Glucophage]     


 


naproxen [From Naprosyn] AdvReac Mild Headache Verified 09/30/17 17:09


 


opium *RETIRED-04/24/13 AdvReac Mild DON'T LIKE Verified 09/30/17 17:09





[From B & O 16-A Supprette]   THEM  


 


phenytoin sodium AdvReac Mild FEEL DRUNK Verified 09/30/17 17:09





[From Dilantin]     


 


phenytoin sodium extended AdvReac Mild FEEL DRUNK Verified 09/30/17 17:09





[From Dilantin]     


 


Lactobacillus acidophilus AdvReac   Verified 09/30/17 17:09





[From Acidophilus]     


 


diuretics Allergy Intermediate HEART Uncoded 09/30/17 17:09





   ARRHYTHMIAS  


 


influenza vaccine AdvReac   Uncoded 09/30/17 17:09








 Vital Signs (Last 24 hours)











  Temp Pulse Resp BP Pulse Ox


 


 02/13/18 12:47  97.3 F  78  20  140/68  96


 


 02/13/18 12:00    17  


 


 02/13/18 10:37   70  18   99


 


 02/13/18 08:00    16  


 


 02/13/18 07:30   60  16   98


 


 02/13/18 07:15  97.8 F  62  18  148/66  96


 


 02/13/18 04:00  98.2 F  64  18  168/76  90 L


 


 02/13/18 03:50    18  


 


 02/13/18 03:00   66  18   96


 


 02/13/18 00:00  97.8 F  67  18  161/72  92 L


 


 02/12/18 23:20   64  18   96


 


 02/12/18 21:22   69  20   96


 


 02/12/18 20:00  97.5 F  72  18  173/121  96


 


 02/12/18 18:03  97.8 F  67  20  175/74  96








 Home Medications











 Medication  Instructions  Recorded  Confirmed  Last Taken  Type


 


Acetaminophen 500 mg*** [Tylenol 1,000 mg PO HS 02/12/18 02/12/18 02/11/18 

History





Extra Strength 500 mg***]     


 


Albuterol/Ipratropium 3ml Neb* 3 ml IH QID 02/12/18 02/12/18 02/12/18 History





[DUONEB 0.5-3 MG/3 ml Neb**]     


 


Lisinopril 20 mg*** [Zestril 20 10 mg PO BID 02/12/18 02/12/18 02/12/18 History





MG***]     


 


Multivitamin [Multivitamins] 1 each PO DAILY 02/12/18 02/12/18 02/12/18 History


 


Nitroglycerin 0.4 mg Tablet*** 0.4 mg SL UD 02/12/18 02/12/18 Unknown History





[Nitrostat 0.4 MG Tablet***]     








 Current Medications











Generic Name Dose Route Start Last Admin





  Trade Name Freq  PRN Reason Stop Dose Admin


 


Acetaminophen  1,000 mg  02/13/18 22:00  





  Tylenol Extra Strength 500 Mg***  PO  03/15/18 21:59  





  HS [facility]   


 


Albuterol Sulfate  2 puff  02/13/18 06:49  





  Proventil Common Canister***  IH  03/15/18 06:48  





  QID PRN PRN   





  SHORTNESS OF BREATH   


 


Albuterol/Ipratropium  3 ml  02/12/18 19:00  02/13/18 10:37





  Duoneb 0.5-3 Mg/3 Ml Neb**  IH  03/14/18 18:59  3 ml





  Q4HRT ELEUTERIO   Administration


 


Amlodipine Besylate  5 mg  02/13/18 10:00  02/13/18 09:56





  Norvasc 5 Mg***  PO  03/15/18 09:59  5 mg





  DAILY ELEUTERIO   Administration


 


Carvedilol  12.5 mg  02/12/18 22:00  02/13/18 09:57





  Coreg 12.5 Mg***  PO  03/14/18 21:59  12.5 mg





  BID ELEUTERIO   Administration


 


Enoxaparin Sodium  40 mg  02/13/18 10:00  02/13/18 09:56





  Enoxaparin Sodium  SQ  03/15/18 09:59  40 mg





  DAILY ELEUTERIO   Administration


 


Fluoxetine HCl  20 mg  02/13/18 10:00  02/13/18 09:56





  Prozac 20 Mg***  PO  03/15/18 09:59  20 mg





  DAILY ELEUTERIO   Administration


 


Glimepiride  2 mg  02/13/18 10:00  02/13/18 09:57





  Amaryl 2 Mg***  PO  03/15/18 09:59  2 mg





  DAILY ELEUTERIO   Administration


 


Sodium Chloride  1,000 mls @ 100 mls/hr  02/12/18 16:45  02/13/18 07:59





  Sodium Chloride 0.9% 1000 Ml  IV  03/14/18 16:44  100 mls/hr





  .Q10H ELEUTERIO   Administration


 


Levofloxacin/Dextrose  500 mg in 100 mls @ 100 mls/hr  02/13/18 22:00  





  Levofloxacin 500mg/100ml D5w  IV  03/15/18 21:59  





  Q24H22 ELEUTREIO   


 


Insulin Glargine  30 unit  02/13/18 19:00  





  Lantus Insulin**  SQ  03/15/18 18:59  





  1900 ELEUTERIO   


 


Levothyroxine Sodium  25 mcg  02/13/18 10:00  02/13/18 09:57





  Synthroid 25 Mcg***  PO  03/15/18 09:59  25 mcg





  DAILY ELEUTERIO   Administration


 


Lisinopril  10 mg  02/13/18 10:00  02/13/18 09:57





  Zestril 10 Mg***  PO  03/15/18 09:59  10 mg





  BID ELEUTERIO   Administration


 


Loratadine  10 mg  02/13/18 10:00  02/13/18 09:56





  Claritin 10 Mg***  PO  03/15/18 09:59  10 mg





  DAILY ELEUTERIO   Administration


 


Metolazone  2.5 mg  02/13/18 07:00  





  Zaroxolyn 2.5 Mg**  PO  03/15/18 06:59  





  DAILY PRN PRN   


 


Multivitamins  1 tab  02/13/18 10:00  02/13/18 09:56





  Theragran Multivitamin***  PO  03/15/18 09:59  1 tab





  DAILY ELEUTERIO   Administration


 


Nitroglycerin  0.4 mg  02/13/18 07:00  





  Nitrostat 0.4 Mg Tablet***  SL  03/15/18 06:59  





  UD PRN   





  CHEST PAIN   


 


Pantoprazole Sodium  40 mg  02/13/18 10:00  02/13/18 09:57





  Protonix 40mg Tablet***  PO  03/15/18 09:59  40 mg





  DAILY ELEUTERIO   Administration


 


Potassium Chloride  20 meq  02/12/18 22:00  02/13/18 09:56





  Klor Con 10 Meq***  PO  03/14/18 21:59  20 meq





  BID ELEUTERIO   Administration


 


Fluticasone/Salmeterol  2 puff  02/12/18 19:00  02/13/18 07:30





  Advair Hfa 115/21 Common Canister*  IH  03/14/18 18:59  2 puff





  BIDRT ELEUTERIO   Administration














Discontinued Medications














Generic Name Dose Route Start Last Admin





  Trade Name Freq  PRN Reason Stop Dose Admin


 


Acetaminophen  500 mg  02/12/18 22:00  02/12/18 21:17





  Tylenol Extra Strength 500 Mg***  PO  03/14/18 21:59  500 mg





  HS ELEUTERIO   Administration


 


Albuterol/Ipratropium  3 ml  02/13/18 07:00  





  Duoneb 0.5-3 Mg/3 Ml Neb**  IH  03/15/18 06:59  





  QIDRT ELEUTERIO   


 


Aspirin  81 mg  02/13/18 10:00  02/13/18 09:56





  Ecotrin 81 Mg***  PO  03/15/18 09:59  Not Given





  DAILY ELEUTERIO   


 


Levofloxacin/Dextrose  500 mg in 100 mls @ 100 mls/hr  02/12/18 18:30  02/12/18 

20:02





  Levofloxacin 500mg/100ml D5w  IV  03/14/18 18:29  100 mls/hr





  Q24H10 ELEUTERIO   Administration


 


Insulin Glargine  30 unit  02/12/18 22:00  02/12/18 21:32





  Lantus Insulin**  SQ  03/14/18 21:59  30 unit





  HS ELEUTERIO   Administration


 


Lisinopril  20 mg  02/12/18 22:00  02/12/18 21:18





  Zestril 20 Mg***  PO  03/14/18 21:59  20 mg





  BID ELEUTERIO   Administration


 


Fluticasone/Salmeterol  1 each  02/13/18 07:00  





  Advair 250-50 Diskus 14 Dose***  IH  03/15/18 06:59  





  BIDRT ELEUTERIO   








 Intake & Output (Last 24 hours)











 02/11/18 02/12/18 02/13/18 02/14/18





 11:59 11:59 11:59 11:59


 


Intake Total   2116 


 


Output Total   200 


 


Balance   1916 


 


Weight   104.7 kg 








 Microbiology Results (Last 24 hours)





02/12/18 19:35   Blood    - Pending


02/12/18 19:35   Blood   Blood Culture - Pending


02/12/18 19:00   Blood    - Pending


02/12/18 19:00   Blood   Blood Culture - Pending





 Laboratory Results (Last 24 hours)











  02/12/18 02/12/18 02/12/18





  18:00 17:38 17:38


 


WBC   


 


RBC   


 


Hgb   


 


Hct   


 


MCV   


 


MCH   


 


MCHC   


 


RDW   


 


Plt Count   


 


MPV   


 


Sodium    139


 


Potassium    3.8


 


Chloride    104


 


Carbon Dioxide    26.9


 


Anion Gap    12.2


 


BUN    15


 


Creatinine    0.91


 


Estimated GFR    > 60


 


Glucose    162 H


 


Hemoglobin A1c  7.5 H  


 


Calcium    8.8


 


Total Bilirubin    0.40


 


AST    12 L


 


ALT    14


 


Alkaline Phosphatase    60


 


Serum Total Protein    6.5


 


Albumin    3.2 L


 


Influenza Type A Ag   NEGATIVE 


 


Influenza Type B Ag   NEGATIVE 


 


RSV (PCR)   NEGATIVE 














  02/12/18





  17:38


 


WBC  9.2


 


RBC  5.11


 


Hgb  14.7


 


Hct  44.8


 


MCV  87.7


 


MCH  28.8


 


MCHC  32.8


 


RDW  13.7


 


Plt Count  165


 


MPV  11.2 H


 


Sodium 


 


Potassium 


 


Chloride 


 


Carbon Dioxide 


 


Anion Gap 


 


BUN 


 


Creatinine 


 


Estimated GFR 


 


Glucose 


 


Hemoglobin A1c 


 


Calcium 


 


Total Bilirubin 


 


AST 


 


ALT 


 


Alkaline Phosphatase 


 


Serum Total Protein 


 


Albumin 


 


Influenza Type A Ag 


 


Influenza Type B Ag 


 


RSV (PCR) 








 Orders (Last 24 hours)











 Category Date Time Status


 


 Bedrest with BRP/BSC AS TOLERATED Activity  02/12/18 18:16 Active


 


 ACCUCHECK [Accucheck] ACHS Care  02/12/18 17:58 Active


 


 Admission/Status Order ROUTINE Care  02/12/18 16:16 Active


 


 IV Insertion ROUTINE Care  02/12/18 18:16 Completed


 


 Implement Pneumonia Pathway ROUTINE Care  02/12/18 18:16 Active


 


 Cardio-Pulmonary Rehab .as ordered Cons  02/12/18 17:29 Active


 


 /Discharge Plan ROUTINE Cons  02/12/18 18:56 Active


 


 1800 Calorie ADA Diet  02/12/18 Dinner Active


 


 Nutritional Admission Screen once Diet  02/12/18 18:56 Completed


 


 CHEST 2 VIEWS (PA AND LAT) Urgent Exams  02/12/18 17:17 Completed


 


 CHEST WITHOUT CONTRAST [CT] Urgent Exams  02/12/18 18:30 Completed


 


 BLOOD CULTURE Urgent Lab  02/12/18 19:35 Received


 


 CBC Urgent Lab  02/12/18 17:38 Completed


 


 CMP Urgent Lab  02/12/18 17:38 Completed


 


 HEMOGLOBIN A1C Urgent Lab  02/12/18 18:00 Completed


 


 Respiratory Panel Urgent Lab  02/12/18 17:38 Completed


 


 Acetaminophen 500 mg*** [Tylenol Extra Strength 500 mg* Med  02/13/18 22:00 

Active





 **]   





 1,000 mg PO HS   


 


 Acetaminophen 500 mg*** [Tylenol Extra Strength 500 mg* Med  02/12/18 22:00 

Discontinued





 **]   





 500 mg PO HS   


 


 Albuterol Common Canister*** [Proventil Common Canister Med  02/13/18 06:49 

Active





 ***]   





 2 puff IH QID PRN PRN   


 


 Albuterol/Ipratropium 3ml Neb* [DUONEB 0.5-3 MG/3 ml Med  02/12/18 19:00 Active





 Neb**]   





 3 ml IH Q4HRT   


 


 Albuterol/Ipratropium 3ml Neb* [DUONEB 0.5-3 MG/3 ml Med  02/13/18 07:00 

Discontinued





 Neb**]   





 3 ml IH QIDRT   


 


 Amlodipine Besylate 5 mg*** [Norvasc 5 mg***] Med  02/13/18 10:00 Active





 5 mg PO DAILY   


 


 Aspirin EC 81 mg*** [Ecotrin 81 mg***] Med  02/13/18 10:00 Discontinued





 81 mg PO DAILY   


 


 Carvedilol 12.5 mg*** [Coreg 12.5 mg***] Med  02/12/18 22:00 Active





 12.5 mg PO BID   


 


 Enoxaparin Sodium*** [Enoxaparin Sodium] Med  02/13/18 10:00 Active





 40 mg SQ DAILY   


 


 Fluoxetine HCl 20 mg*** [Prozac 20 MG***] Med  02/13/18 10:00 Active





 20 mg PO DAILY   


 


 Fluticasone/Salmeterol 115/21 [Advair Hfa 115/21 Common Med  02/12/18 19:00 

Active





 canister*]   





 2 puff IH BIDRT   


 


 Fluticasone/Salmeterol Disc*** [Advair 250-50 Diskus 14 Med  02/13/18 07:00 

Discontinued





 Dose***]   





 1 each IH BIDRT   


 


 Glimepiride 2 mg*** [Amaryl 2 MG***] Med  02/13/18 10:00 Active





 2 mg PO DAILY   


 


 Insulin Glargine** [Lantus Insulin**] Med  02/13/18 19:00 Active





 30 unit SQ 1900   


 


 Insulin Glargine** [Lantus Insulin**] Med  02/12/18 22:00 Discontinued





 30 unit SQ HS   


 


 Levofloxacin [Levofloxacin 500MG/100ML D5W] Med  02/12/18 18:30 Discontinued





 500 mg in 100 ml IV Q24H10   


 


 Levofloxacin [Levofloxacin 500MG/100ML D5W] Med  02/13/18 22:00 Active





 500 mg in 100 ml IV Q24H22   


 


 Levothyroxine Sodium 25 Mcg*** [Synthroid 25 Mcg***] Med  02/13/18 10:00 Active





 25 mcg PO DAILY   


 


 Lisinopril 10 mg*** [Zestril 10 MG***] Med  02/13/18 10:00 Active





 10 mg PO BID   


 


 Lisinopril 20 mg*** [Zestril 20 MG***] Med  02/12/18 22:00 Discontinued





 20 mg PO BID   


 


 Loratadine 10 mg*** [Claritin 10 mg***] Med  02/13/18 10:00 Active





 10 mg PO DAILY   


 


 Metolazone 2.5 mg** [Zaroxolyn 2.5 MG**] Med  02/13/18 07:00 Active





 2.5 mg PO DAILY PRN PRN   


 


 Multivitamins,Therapeutic Tab* [Theragran Multivitamin* Med  02/13/18 10:00 

Active





 **]   





 1 tab PO DAILY   


 


 NaCl 0.9% 1000 ml [Sodium Chloride 0.9% 1000 ML] 1,000 Med  02/12/18 16:45 

Active





 ml   





  mls/hr   


 


 Nitroglycerin 0.4 mg Tablet*** [Nitrostat 0.4 MG Tablet Med  02/13/18 07:00 

Active





 ***]   





 0.4 mg SL UD PRN   


 


 PANTOPRAZOLE 40 mg Tablet*** [Protonix 40MG Tablet***] Med  02/13/18 10:00 

Active





 40 mg PO DAILY   


 


 Potassium Chloride 10 Meq Tab* [Klor Con 10 MEQ***] Med  02/12/18 22:00 Active





 20 meq PO BID   


 


 RT Screen per Nursing Assess ONCE RT  02/12/18 18:56 Completed


 


 Respiratory MDI BID RT  02/12/18 19:00 Active


 


 Respiratory Nebulizer Q4H RT  02/12/18 19:00 Active


 


 Respiratory Therapy Consult ROUTINE RT  02/12/18 18:30 Completed














- Vitals & Intake/Output


Vital Signs: 





 Vital Signs











Temperature  97.3 F   02/13/18 12:47


 


Pulse Rate  78   02/13/18 12:47


 


Respiratory Rate  20   02/13/18 12:47


 


Blood Pressure  140/68   02/13/18 12:47


 


O2 Sat by Pulse Oximetry  96   02/13/18 12:47











Intake & Output: 





 Intake & Output











 02/11/18 02/12/18 02/13/18 02/14/18





 11:59 11:59 11:59 11:59


 


Intake Total   2116 


 


Output Total   200 


 


Balance   1916 


 


Weight   104.7 kg 














- Lab


Result Diagrams: 


 02/12/18 17:38





 02/12/18 17:38


Lab Results-Last 24 Hrs: 





 Accuchecks











Date                           02/13/18


 


Date                           02/13/18


 


Date                           02/12/18


 


Time                           10:57


 


Time                           08:00


 


Time                           21:35


 


Accucheck Value:               195


 


Accucheck Value:               109


 


Accucheck Value:               186











 Lab Results-Last 24 Hours











  02/12/18 02/12/18 02/12/18 Range/Units





  17:38 17:38 17:38 


 


WBC  9.2    (4.0-10.5)  K/mm3


 


RBC  5.11    (4.1-5.4)  M/mm3


 


Hgb  14.7    (12.0-16.0)  gm/dl


 


Hct  44.8    (35-47)  %


 


MCV  87.7    ()  fl


 


MCH  28.8    (26-32)  pg


 


MCHC  32.8    (32-36)  g/dl


 


RDW  13.7    (11.5-14.0)  %


 


Plt Count  165    (150-450)  K/mm3


 


MPV  11.2 H    (6-9.5)  fl


 


Sodium   139   (136-145)  mEq/L


 


Potassium   3.8   (3.5-5.1)  mEq/L


 


Chloride   104   ()  mEq/L


 


Carbon Dioxide   26.9   (21-32)  mEq/L


 


Anion Gap   12.2   (5-15)  MEQ/L


 


BUN   15   (9-20)  mg/dL


 


Creatinine   0.91   (0.55-1.30)  mg/dl


 


Estimated GFR   > 60   ML/MIN


 


Glucose   162 H   ()  MG/DL


 


Hemoglobin A1c     (4.5-6.2)  


 


Calcium   8.8   (8.5-10.1)  mg/dL


 


Total Bilirubin   0.40   (0.2-1.0)  mg/dL


 


AST   12 L   (15-37)  U/L


 


ALT   14   (12-78)  U/L


 


Alkaline Phosphatase   60   ()  U/L


 


Serum Total Protein   6.5   (6.4-8.2)  gm/dL


 


Albumin   3.2 L   (3.4-5.0)  g/dL


 


Influenza Type A Ag    NEGATIVE  (NEGATIVE)  


 


Influenza Type B Ag    NEGATIVE  (NEGATIVE)  


 


RSV (PCR)    NEGATIVE  (Negative)  














  02/12/18 Range/Units





  18:00 


 


WBC   (4.0-10.5)  K/mm3


 


RBC   (4.1-5.4)  M/mm3


 


Hgb   (12.0-16.0)  gm/dl


 


Hct   (35-47)  %


 


MCV   ()  fl


 


MCH   (26-32)  pg


 


MCHC   (32-36)  g/dl


 


RDW   (11.5-14.0)  %


 


Plt Count   (150-450)  K/mm3


 


MPV   (6-9.5)  fl


 


Sodium   (136-145)  mEq/L


 


Potassium   (3.5-5.1)  mEq/L


 


Chloride   ()  mEq/L


 


Carbon Dioxide   (21-32)  mEq/L


 


Anion Gap   (5-15)  MEQ/L


 


BUN   (9-20)  mg/dL


 


Creatinine   (0.55-1.30)  mg/dl


 


Estimated GFR   ML/MIN


 


Glucose   ()  MG/DL


 


Hemoglobin A1c  7.5 H  (4.5-6.2)  


 


Calcium   (8.5-10.1)  mg/dL


 


Total Bilirubin   (0.2-1.0)  mg/dL


 


AST   (15-37)  U/L


 


ALT   (12-78)  U/L


 


Alkaline Phosphatase   ()  U/L


 


Serum Total Protein   (6.4-8.2)  gm/dL


 


Albumin   (3.4-5.0)  g/dL


 


Influenza Type A Ag   (NEGATIVE)  


 


Influenza Type B Ag   (NEGATIVE)  


 


RSV (PCR)   (Negative)  











Micro Results-Entire Visit: 





 Accuchecks











Date                           02/13/18


 


Date                           02/13/18


 


Date                           02/12/18


 


Time                           10:57


 


Time                           08:00


 


Time                           21:35


 


Accucheck Value:               195


 


Accucheck Value:               109


 


Accucheck Value:               186

















- Radiology Exams


Ordered Rad Exams-Entire Visit: 





 Radiology Procedures











 Category Date Time Status


 


 CHEST 2 VIEWS (PA AND LAT) Urgent Exams  02/12/18 17:17 Completed


 


 CHEST WITHOUT CONTRAST [CT] Urgent Exams  02/12/18 18:30 Completed








Impression:


1.  New left upper lobe/right base pulmonary masses and mediastinal/right


hilar lymphadenopathy worrisome for metastasis.


2.  Stable postsurgical changes, fatty liver, splenomegaly, and evidence for


old granulomatous disease





- Procedures and Test


Procedures and Tests throughout Hospitalization: 





 Therapy Orders & Screens





02/12/18 18:30


Respiratory Therapy Consult ROUTINE 


   Comment: 


   Reason For Exam: 


   Diagnosis: Pneumonia,ae copd





02/12/18 18:56


RT Screen per Nursing Assess ONCE 


   Comment: Protocol Order


   Physician Instructions: Greater than 3 points order RT Admission Screen


   Reason For Exam: Triggered on Admission


   Diagnosis: Pneumonia,ae copd


   Diagnosis: Pneumonia,ae copd


   Pneumonia: Yes


   Home O2: No


   Asthma: Yes


   CHF: Yes


   Home CPAP/BIPAP: No


   Home Nebs/MDI: Yes


   Total Points: 15





02/12/18 19:00


Respiratory MDI BID 


   Comment: 


   Diagnosis: Pneumonia,ae copd


Respiratory Nebulizer Q4H 


   Comment: 


   Diagnosis: Pneumonia,ae copd














Discharge Exam


General Appearance: no apparent distress, alert


Neurologic Exam: alert, oriented x 3, cooperative, normal mood/affect, nml 

cerebellar function, sensation nml, No motor deficits


Skin Exam: normal color, warm, dry


Eye Exam: PERRL, EOMI, eyes nml inspection


Ears, Nose, Throat Exam: normal ENT inspection, pharynx normal, moist mucous 

membranes


Neck Exam: normal inspection, non-tender, supple, full range of motion


Respiratory Exam: diminished breath sounds, prolonged expirations, crackles/

rales, rhonchi, No respiratory distress


Cardiovascular Exam: regular rate/rhythm, normal heart sounds


Gastrointestinal/Abdomen Exam: soft, No tenderness, No mass


Extremity Exam: normal inspection, normal range of motion


Back Exam: normal inspection, normal range of motion, No CVA tenderness, No 

vertebral tenderness


Pelvic Exam: deferred


Rectal Exam: deferred





Final Diagnosis/Problem List





- Final Discharge Diagnosis/Problem


(1) Pneumonia


Current Visit: Yes   Status: Acute   


Assessment & Plan: 








 Chief Complaint





Diagnosis                        Pneumonia,ae copd





 Allergies











Allergy/AdvReac Type Severity Reaction Status Date / Time


 


doxycycline Allergy Intermediate Swelling Verified 09/30/17 17:09


 


Iodinated Contrast- Oral and Allergy Intermediate Hives Verified 09/30/17 17:09





IV Dye     


 


belladonna alkaloids Allergy Mild CHILD Verified 09/30/17 17:09





[From B & O 16-A Supprette]   ALLERGY  


 


cephalexin monohydrate Allergy Mild Hives Verified 09/30/17 17:09





[From Keflex]     


 


furosemide [From Lasix] Allergy Mild HEART Verified 09/30/17 17:09





   ARTHYM  


 


prochlorperazine edisylate Allergy Mild HALLUCINATI Verified 09/30/17 17:09





[From Compazine]   ONS  


 


prochlorperazine maleate Allergy Mild HALLUCINATI Verified 09/30/17 17:09





[From Compazine]   ONS  


 


Sulfa (Sulfonamide Allergy Mild Hives Verified 09/30/17 17:09





Antibiotics)     





[Sulfa(Sulfonamide     





Antibiotics)]     


 


codeine [Codeine] AdvReac Mild Headache Verified 09/30/17 17:09


 


hydrocodone bitartrate AdvReac Mild Headache Verified 09/30/17 17:09





[From Vicodin]     


 


ketorolac tromethamine AdvReac Mild Vomiting Verified 09/30/17 17:09





[From Toradol]     


 


metformin HCl AdvReac Mild Vomiting Verified 09/30/17 17:09





[From Glucophage]     


 


naproxen [From Naprosyn] AdvReac Mild Headache Verified 09/30/17 17:09


 


opium *RETIRED-04/24/13 AdvReac Mild DON'T LIKE Verified 09/30/17 17:09





[From B & O 16-A Supprette]   THEM  


 


phenytoin sodium AdvReac Mild FEEL DRUNK Verified 09/30/17 17:09





[From Dilantin]     


 


phenytoin sodium extended AdvReac Mild FEEL DRUNK Verified 09/30/17 17:09





[From Dilantin]     


 


Lactobacillus acidophilus AdvReac   Verified 09/30/17 17:09





[From Acidophilus]     


 


diuretics Allergy Intermediate HEART Uncoded 09/30/17 17:09





   ARRHYTHMIAS  


 


influenza vaccine AdvReac   Uncoded 09/30/17 17:09








 Vital Signs (Last 24 hours)











  Temp Pulse Resp BP Pulse Ox


 


 02/13/18 12:47  97.3 F  78  20  140/68  96


 


 02/13/18 12:00    17  


 


 02/13/18 10:37   70  18   99


 


 02/13/18 08:00    16  


 


 02/13/18 07:30   60  16   98


 


 02/13/18 07:15  97.8 F  62  18  148/66  96


 


 02/13/18 04:00  98.2 F  64  18  168/76  90 L


 


 02/13/18 03:50    18  


 


 02/13/18 03:00   66  18   96


 


 02/13/18 00:00  97.8 F  67  18  161/72  92 L


 


 02/12/18 23:20   64  18   96


 


 02/12/18 21:22   69  20   96


 


 02/12/18 20:00  97.5 F  72  18  173/121  96


 


 02/12/18 18:03  97.8 F  67  20  175/74  96








 Home Medications











 Medication  Instructions  Recorded  Confirmed  Last Taken  Type


 


Acetaminophen 500 mg*** [Tylenol 1,000 mg PO HS 02/12/18 02/12/18 02/11/18 

History





Extra Strength 500 mg***]     


 


Albuterol/Ipratropium 3ml Neb* 3 ml IH QID 02/12/18 02/12/18 02/12/18 History





[DUONEB 0.5-3 MG/3 ml Neb**]     


 


Lisinopril 20 mg*** [Zestril 20 10 mg PO BID 02/12/18 02/12/18 02/12/18 History





MG***]     


 


Multivitamin [Multivitamins] 1 each PO DAILY 02/12/18 02/12/18 02/12/18 History


 


Nitroglycerin 0.4 mg Tablet*** 0.4 mg SL UD 02/12/18 02/12/18 Unknown History





[Nitrostat 0.4 MG Tablet***]     








 Current Medications











Generic Name Dose Route Start Last Admin





  Trade Name Freq  PRN Reason Stop Dose Admin


 


Acetaminophen  1,000 mg  02/13/18 22:00  





  Tylenol Extra Strength 500 Mg***  PO  03/15/18 21:59  





  HS ELEUTERIO   


 


Albuterol Sulfate  2 puff  02/13/18 06:49  





  Proventil Common Canister***  IH  03/15/18 06:48  





  QID PRN PRN   





  SHORTNESS OF BREATH   


 


Albuterol/Ipratropium  3 ml  02/12/18 19:00  02/13/18 10:37





  Duoneb 0.5-3 Mg/3 Ml Neb**  IH  03/14/18 18:59  3 ml





  Q4HRT ELEUTERIO   Administration


 


Amlodipine Besylate  5 mg  02/13/18 10:00  02/13/18 09:56





  Norvasc 5 Mg***  PO  03/15/18 09:59  5 mg





  DAILY ELEUTERIO   Administration


 


Carvedilol  12.5 mg  02/12/18 22:00  02/13/18 09:57





  Coreg 12.5 Mg***  PO  03/14/18 21:59  12.5 mg





  BID ELEUTERIO   Administration


 


Enoxaparin Sodium  40 mg  02/13/18 10:00  02/13/18 09:56





  Enoxaparin Sodium  SQ  03/15/18 09:59  40 mg





  DAILY ELEUTERIO   Administration


 


Fluoxetine HCl  20 mg  02/13/18 10:00  02/13/18 09:56





  Prozac 20 Mg***  PO  03/15/18 09:59  20 mg





  DAILY ELEUTERIO   Administration


 


Glimepiride  2 mg  02/13/18 10:00  02/13/18 09:57





  Amaryl 2 Mg***  PO  03/15/18 09:59  2 mg





  DAILY ELEUTERIO   Administration


 


Sodium Chloride  1,000 mls @ 100 mls/hr  02/12/18 16:45  02/13/18 07:59





  Sodium Chloride 0.9% 1000 Ml  IV  03/14/18 16:44  100 mls/hr





  .Q10H ELEUTERIO   Administration


 


Levofloxacin/Dextrose  500 mg in 100 mls @ 100 mls/hr  02/13/18 22:00  





  Levofloxacin 500mg/100ml D5w  IV  03/15/18 21:59  





  Q24H22 ELEUTERIO   


 


Insulin Glargine  30 unit  02/13/18 19:00  





  Lantus Insulin**  SQ  03/15/18 18:59  





  1900 ELEUTERIO   


 


Levothyroxine Sodium  25 mcg  02/13/18 10:00  02/13/18 09:57





  Synthroid 25 Mcg***  PO  03/15/18 09:59  25 mcg





  DAILY ELEUTERIO   Administration


 


Lisinopril  10 mg  02/13/18 10:00  02/13/18 09:57





  Zestril 10 Mg***  PO  03/15/18 09:59  10 mg





  BID ELEUTERIO   Administration


 


Loratadine  10 mg  02/13/18 10:00  02/13/18 09:56





  Claritin 10 Mg***  PO  03/15/18 09:59  10 mg





  DAILY ELEUTERIO   Administration


 


Metolazone  2.5 mg  02/13/18 07:00  





  Zaroxolyn 2.5 Mg**  PO  03/15/18 06:59  





  DAILY PRN PRN   


 


Multivitamins  1 tab  02/13/18 10:00  02/13/18 09:56





  Theragran Multivitamin***  PO  03/15/18 09:59  1 tab





  DAILY ELEUTERIO   Administration


 


Nitroglycerin  0.4 mg  02/13/18 07:00  





  Nitrostat 0.4 Mg Tablet***  SL  03/15/18 06:59  





  UD PRN   





  CHEST PAIN   


 


Pantoprazole Sodium  40 mg  02/13/18 10:00  02/13/18 09:57





  Protonix 40mg Tablet***  PO  03/15/18 09:59  40 mg





  DAILY ELEUTERIO   Administration


 


Potassium Chloride  20 meq  02/12/18 22:00  02/13/18 09:56





  Klor Con 10 Meq***  PO  03/14/18 21:59  20 meq





  BID ELEUTERIO   Administration


 


Fluticasone/Salmeterol  2 puff  02/12/18 19:00  02/13/18 07:30





  Advair Hfa 115/21 Common Canister*  IH  03/14/18 18:59  2 puff





  BIDRT ELEUTERIO   Administration














Discontinued Medications














Generic Name Dose Route Start Last Admin





  Trade Name Jorge  PRN Reason Stop Dose Admin


 


Acetaminophen  500 mg  02/12/18 22:00  02/12/18 21:17





  Tylenol Extra Strength 500 Mg***  PO  03/14/18 21:59  500 mg





  HS ELEUTERIO   Administration


 


Albuterol/Ipratropium  3 ml  02/13/18 07:00  





  Duoneb 0.5-3 Mg/3 Ml Neb**  IH  03/15/18 06:59  





  QIDRT ELEUTERIO   


 


Aspirin  81 mg  02/13/18 10:00  02/13/18 09:56





  Ecotrin 81 Mg***  PO  03/15/18 09:59  Not Given





  DAILY ELEUTERIO   


 


Levofloxacin/Dextrose  500 mg in 100 mls @ 100 mls/hr  02/12/18 18:30  02/12/18 

20:02





  Levofloxacin 500mg/100ml D5w  IV  03/14/18 18:29  100 mls/hr





  Q24H10 ELEUTERIO   Administration


 


Insulin Glargine  30 unit  02/12/18 22:00  02/12/18 21:32





  Lantus Insulin**  SQ  03/14/18 21:59  30 unit





  HS ELEUTERIO   Administration


 


Lisinopril  20 mg  02/12/18 22:00  02/12/18 21:18





  Zestril 20 Mg***  PO  03/14/18 21:59  20 mg





  BID ELEUTERIO   Administration


 


Fluticasone/Salmeterol  1 each  02/13/18 07:00  





  Advair 250-50 Diskus 14 Dose***  IH  03/15/18 06:59  





  BIDRT ELEUTERIO   








 Intake & Output (Last 24 hours)











 02/11/18 02/12/18 02/13/18 02/14/18





 11:59 11:59 11:59 11:59


 


Intake Total   2116 


 


Output Total   200 


 


Balance   1916 


 


Weight   104.7 kg 








 Microbiology Results (Last 24 hours)





02/12/18 19:35   Blood    - Pending


02/12/18 19:35   Blood   Blood Culture - Pending


02/12/18 19:00   Blood    - Pending


02/12/18 19:00   Blood   Blood Culture - Pending





 Laboratory Results (Last 24 hours)











  02/12/18 02/12/18 02/12/18





  18:00 17:38 17:38


 


WBC   


 


RBC   


 


Hgb   


 


Hct   


 


MCV   


 


MCH   


 


MCHC   


 


RDW   


 


Plt Count   


 


MPV   


 


Sodium    139


 


Potassium    3.8


 


Chloride    104


 


Carbon Dioxide    26.9


 


Anion Gap    12.2


 


BUN    15


 


Creatinine    0.91


 


Estimated GFR    > 60


 


Glucose    162 H


 


Hemoglobin A1c  7.5 H  


 


Calcium    8.8


 


Total Bilirubin    0.40


 


AST    12 L


 


ALT    14


 


Alkaline Phosphatase    60


 


Serum Total Protein    6.5


 


Albumin    3.2 L


 


Influenza Type A Ag   NEGATIVE 


 


Influenza Type B Ag   NEGATIVE 


 


RSV (PCR)   NEGATIVE 














  02/12/18





  17:38


 


WBC  9.2


 


RBC  5.11


 


Hgb  14.7


 


Hct  44.8


 


MCV  87.7


 


MCH  28.8


 


MCHC  32.8


 


RDW  13.7


 


Plt Count  165


 


MPV  11.2 H


 


Sodium 


 


Potassium 


 


Chloride 


 


Carbon Dioxide 


 


Anion Gap 


 


BUN 


 


Creatinine 


 


Estimated GFR 


 


Glucose 


 


Hemoglobin A1c 


 


Calcium 


 


Total Bilirubin 


 


AST 


 


ALT 


 


Alkaline Phosphatase 


 


Serum Total Protein 


 


Albumin 


 


Influenza Type A Ag 


 


Influenza Type B Ag 


 


RSV (PCR) 








 Orders (Last 24 hours)











 Category Date Time Status


 


 Bedrest with BRP/BSC AS TOLERATED Activity  02/12/18 18:16 Active


 


 ACCUCHECK [Accucheck] ACHS Care  02/12/18 17:58 Active


 


 Admission/Status Order ROUTINE Care  02/12/18 16:16 Active


 


 IV Insertion ROUTINE Care  02/12/18 18:16 Completed


 


 Implement Pneumonia Pathway ROUTINE Care  02/12/18 18:16 Active


 


 Cardio-Pulmonary Rehab .as ordered Cons  02/12/18 17:29 Active


 


 /Discharge Plan ROUTINE Cons  02/12/18 18:56 Active


 


 1800 Calorie ADA Diet  02/12/18 Dinner Active


 


 Nutritional Admission Screen once Diet  02/12/18 18:56 Completed


 


 CHEST 2 VIEWS (PA AND LAT) Urgent Exams  02/12/18 17:17 Completed


 


 CHEST WITHOUT CONTRAST [CT] Urgent Exams  02/12/18 18:30 Completed


 


 BLOOD CULTURE Urgent Lab  02/12/18 19:35 Received


 


 CBC Urgent Lab  02/12/18 17:38 Completed


 


 CMP Urgent Lab  02/12/18 17:38 Completed


 


 HEMOGLOBIN A1C Urgent Lab  02/12/18 18:00 Completed


 


 Respiratory Panel Urgent Lab  02/12/18 17:38 Completed


 


 Acetaminophen 500 mg*** [Tylenol Extra Strength 500 mg* Med  02/13/18 22:00 

Active





 **]   





 1,000 mg PO HS   


 


 Acetaminophen 500 mg*** [Tylenol Extra Strength 500 mg* Med  02/12/18 22:00 

Discontinued





 **]   





 500 mg PO HS   


 


 Albuterol Common Canister*** [Proventil Common Canister Med  02/13/18 06:49 

Active





 ***]   





 2 puff IH QID PRN PRN   


 


 Albuterol/Ipratropium 3ml Neb* [DUONEB 0.5-3 MG/3 ml Med  02/12/18 19:00 Active





 Neb**]   





 3 ml IH Q4HRT   


 


 Albuterol/Ipratropium 3ml Neb* [DUONEB 0.5-3 MG/3 ml Med  02/13/18 07:00 

Discontinued





 Neb**]   





 3 ml IH QIDRT   


 


 Amlodipine Besylate 5 mg*** [Norvasc 5 mg***] Med  02/13/18 10:00 Active





 5 mg PO DAILY   


 


 Aspirin EC 81 mg*** [Ecotrin 81 mg***] Med  02/13/18 10:00 Discontinued





 81 mg PO DAILY   


 


 Carvedilol 12.5 mg*** [Coreg 12.5 mg***] Med  02/12/18 22:00 Active





 12.5 mg PO BID   


 


 Enoxaparin Sodium*** [Enoxaparin Sodium] Med  02/13/18 10:00 Active





 40 mg SQ DAILY   


 


 Fluoxetine HCl 20 mg*** [Prozac 20 MG***] Med  02/13/18 10:00 Active





 20 mg PO DAILY   


 


 Fluticasone/Salmeterol 115/21 [Advair Hfa 115/21 Common Med  02/12/18 19:00 

Active





 canister*]   





 2 puff IH BIDRT   


 


 Fluticasone/Salmeterol Disc*** [Advair 250-50 Diskus 14 Med  02/13/18 07:00 

Discontinued





 Dose***]   





 1 each IH BIDRT   


 


 Glimepiride 2 mg*** [Amaryl 2 MG***] Med  02/13/18 10:00 Active





 2 mg PO DAILY   


 


 Insulin Glargine** [Lantus Insulin**] Med  02/13/18 19:00 Active





 30 unit SQ 1900   


 


 Insulin Glargine** [Lantus Insulin**] Med  02/12/18 22:00 Discontinued





 30 unit SQ HS   


 


 Levofloxacin [Levofloxacin 500MG/100ML D5W] Med  02/12/18 18:30 Discontinued





 500 mg in 100 ml IV Q24H10   


 


 Levofloxacin [Levofloxacin 500MG/100ML D5W] Med  02/13/18 22:00 Active





 500 mg in 100 ml IV Q24H22   


 


 Levothyroxine Sodium 25 Mcg*** [Synthroid 25 Mcg***] Med  02/13/18 10:00 Active





 25 mcg PO DAILY   


 


 Lisinopril 10 mg*** [Zestril 10 MG***] Med  02/13/18 10:00 Active





 10 mg PO BID   


 


 Lisinopril 20 mg*** [Zestril 20 MG***] Med  02/12/18 22:00 Discontinued





 20 mg PO BID   


 


 Loratadine 10 mg*** [Claritin 10 mg***] Med  02/13/18 10:00 Active





 10 mg PO DAILY   


 


 Metolazone 2.5 mg** [Zaroxolyn 2.5 MG**] Med  02/13/18 07:00 Active





 2.5 mg PO DAILY PRN PRN   


 


 Multivitamins,Therapeutic Tab* [Theragran Multivitamin* Med  02/13/18 10:00 

Active





 **]   





 1 tab PO DAILY   


 


 NaCl 0.9% 1000 ml [Sodium Chloride 0.9% 1000 ML] 1,000 Med  02/12/18 16:45 

Active





 ml   





  mls/hr   


 


 Nitroglycerin 0.4 mg Tablet*** [Nitrostat 0.4 MG Tablet Med  02/13/18 07:00 

Active





 ***]   





 0.4 mg SL UD PRN   


 


 PANTOPRAZOLE 40 mg Tablet*** [Protonix 40MG Tablet***] Med  02/13/18 10:00 

Active





 40 mg PO DAILY   


 


 Potassium Chloride 10 Meq Tab* [Klor Con 10 MEQ***] Med  02/12/18 22:00 Active





 20 meq PO BID   


 


 RT Screen per Nursing Assess ONCE RT  02/12/18 18:56 Completed


 


 Respiratory MDI BID RT  02/12/18 19:00 Active


 


 Respiratory Nebulizer Q4H RT  02/12/18 19:00 Active


 


 Respiratory Therapy Consult ROUTINE RT  02/12/18 18:30 Completed

















(2) Lung mass


Current Visit: Yes   Status: Acute   


Assessment & Plan: 


will transfer patient to [facility] for further high skilled care








(3) Bronchitis


Current Visit: Yes   Status: Acute   





- Discharge


Discharge Date: 02/13/18


[facility]


Condition: Stable


Prescriptions: 


No Action


   Glimepiride 2 mg*** [Amaryl 2 MG***] 2 mg PO DAILY


   Fluoxetine HCl 20 mg*** [Prozac 20 MG***] 20 mg PO DAILY


   Carvedilol 12.5 mg*** [Coreg 12.5 mg***] 12.5 mg PO BID


   Levothyroxine Sodium 25 Mcg*** [Synthroid 25 Mcg***] 25 mcg PO DAILY


   Lutein 10 mg PO DAILY


   Loratadine 10 mg*** [Claritin 10 mg***] 10 mg PO DAILY


   Metolazone 2.5 mg** [Zaroxolyn 2.5 MG**] 2.5 mg PO DAILY PRN PRN


     PRN Reason: edema


   Insulin Detemir [Levemir] 30 unit SQ DAILY


   Albuterol Sulfate [Ventolin Hfa] 8 gm IH QIDPRN PRN


     PRN Reason: sob


   Potassium Chloride 10 Meq Tab* [Klor Con 10 MEQ***] 20 meq PO BID


   Omeprazole 20 MG [Prilosec 20 mg] 20 mg PO DAILY


   Fluticasone/Salmeterol [Advair 250-50 Diskus] 1 each IH BID


   Amlodipine Besylate 5 mg*** [Norvasc 5 mg***] 5 mg PO DAILY


   Acetaminophen 500 mg*** [Tylenol Extra Strength 500 mg***] 1,000 mg PO HS


   Albuterol/Ipratropium 3ml Neb* [DUONEB 0.5-3 MG/3 ml Neb**] 3 ml IH QID


   Multivitamin [Multivitamins] 1 each PO DAILY


   Nitroglycerin 0.4 mg Tablet*** [Nitrostat 0.4 MG Tablet***] 0.4 mg SL UD


   Lisinopril 20 mg*** [Zestril 20 MG***] 10 mg PO BID


Follow up with: 


NANCY BACA MD [Primary Care Provider] - 1 Week

## 2018-04-25 ENCOUNTER — HOSPITAL ENCOUNTER (EMERGENCY)
Dept: HOSPITAL 33 - ED | Age: 78
Discharge: HOME | End: 2018-04-25
Payer: MEDICARE

## 2018-04-25 VITALS — OXYGEN SATURATION: 98 % | SYSTOLIC BLOOD PRESSURE: 168 MMHG | DIASTOLIC BLOOD PRESSURE: 92 MMHG | HEART RATE: 65 BPM

## 2018-04-25 DIAGNOSIS — Z79.899: ICD-10-CM

## 2018-04-25 DIAGNOSIS — Z85.118: ICD-10-CM

## 2018-04-25 DIAGNOSIS — R10.11: Primary | ICD-10-CM

## 2018-04-25 DIAGNOSIS — R11.0: ICD-10-CM

## 2018-04-25 DIAGNOSIS — R53.1: ICD-10-CM

## 2018-04-25 DIAGNOSIS — C34.90: ICD-10-CM

## 2018-04-25 DIAGNOSIS — Z85.41: ICD-10-CM

## 2018-04-25 DIAGNOSIS — Z85.3: ICD-10-CM

## 2018-04-25 DIAGNOSIS — N39.0: ICD-10-CM

## 2018-04-25 LAB
ALBUMIN SERPL-MCNC: 3.4 G/DL (ref 3.5–5)
ALP SERPL-CCNC: 65 U/L (ref 38–126)
ALT SERPL-CCNC: 15 U/L (ref 0–35)
AMYLASE SERPL-CCNC: 70 U/L (ref 30–110)
ANION GAP SERPL CALC-SCNC: 9.6 MEQ/L (ref 5–15)
AST SERPL QL: 12 U/L (ref 14–36)
BASOPHILS # BLD AUTO: 0.03 10*3/UL (ref 0–0.4)
BASOPHILS NFR BLD AUTO: 0.4 % (ref 0–0.4)
BILIRUB BLD-MCNC: 0.8 MG/DL (ref 0.2–1.3)
BUN SERPL-MCNC: 8 MG/DL (ref 7–17)
CALCIUM SPEC-MCNC: 9.9 MG/DL (ref 8.4–10.2)
CHLORIDE SERPL-SCNC: 98 MMOL/L (ref 98–107)
CO2 SERPL-SCNC: 29 MMOL/L (ref 22–30)
CREAT SERPL-MCNC: 0.6 MG/DL (ref 0.52–1.04)
EOSINOPHIL # BLD AUTO: 0.28 10*3/UL (ref 0–0.5)
GLUCOSE SERPL-MCNC: 108 MG/DL (ref 74–106)
GLUCOSE UR-MCNC: NEGATIVE MG/DL
GRANULOCYTES # BLD AUTO: 6.16 10*3/UL (ref 1.4–6.9)
HCT VFR BLD AUTO: 45.3 % (ref 35–47)
HGB BLD-MCNC: 15.2 GM/DL (ref 12–16)
LIPASE SERPL-CCNC: 35 U/L (ref 23–300)
LYMPHOCYTES # SPEC AUTO: 1.04 10*3/UL (ref 1–4.6)
MCH RBC QN AUTO: 28.8 PG (ref 26–32)
MCHC RBC AUTO-ENTMCNC: 33.6 G/DL (ref 32–36)
MONOCYTES # BLD AUTO: 0.66 10*3/UL (ref 0–1.3)
NEUTROPHILS NFR BLD AUTO: 75.4 % (ref 36–66)
PLATELET # BLD AUTO: 131 K/MM3 (ref 150–450)
POTASSIUM SERPLBLD-SCNC: 3.5 MMOL/L (ref 3.5–5.1)
PROT SERPL-MCNC: 6.1 G/DL (ref 6.3–8.2)
PROT UR STRIP-MCNC: NEGATIVE MG/DL
RBC # BLD AUTO: 5.28 M/MM3 (ref 4.1–5.4)
RBC # URNS HPF: (no result) /HPF (ref 0–2)
SODIUM SERPL-SCNC: 134 MMOL/L (ref 137–145)
WBC # BLD AUTO: 8.2 K/MM3 (ref 4–10.5)
WBC URNS QL MICRO: (no result) /HPF (ref 0–5)

## 2018-04-25 PROCEDURE — 96375 TX/PRO/DX INJ NEW DRUG ADDON: CPT

## 2018-04-25 PROCEDURE — 96376 TX/PRO/DX INJ SAME DRUG ADON: CPT

## 2018-04-25 PROCEDURE — 81000 URINALYSIS NONAUTO W/SCOPE: CPT

## 2018-04-25 PROCEDURE — 96374 THER/PROPH/DIAG INJ IV PUSH: CPT

## 2018-04-25 PROCEDURE — 93005 ELECTROCARDIOGRAM TRACING: CPT

## 2018-04-25 PROCEDURE — 87086 URINE CULTURE/COLONY COUNT: CPT

## 2018-04-25 PROCEDURE — 74176 CT ABD & PELVIS W/O CONTRAST: CPT

## 2018-04-25 PROCEDURE — 36000 PLACE NEEDLE IN VEIN: CPT

## 2018-04-25 PROCEDURE — 36415 COLL VENOUS BLD VENIPUNCTURE: CPT

## 2018-04-25 PROCEDURE — 99284 EMERGENCY DEPT VISIT MOD MDM: CPT

## 2018-04-25 PROCEDURE — 83690 ASSAY OF LIPASE: CPT

## 2018-04-25 PROCEDURE — 85025 COMPLETE CBC W/AUTO DIFF WBC: CPT

## 2018-04-25 PROCEDURE — 82150 ASSAY OF AMYLASE: CPT

## 2018-04-25 PROCEDURE — 80053 COMPREHEN METABOLIC PANEL: CPT

## 2018-04-25 NOTE — XRAY
Indication: Right abdominal pain.  Nausea and vomiting.



Multiple contiguous axial images obtained through the abdomen and pelvis

without contrast as ordered.



Comparison: July 30, 2015.



Lung bases again demonstrate minimal bilateral pleural parenchymal

fibrosis/scarring.  New right base 1.0 x 1.4 cm irregular noncalcified nodule.

 Heart is not enlarged.  Stable postsurgical changes at the gastroesophageal

junction.



Noncontrasted stomach and bowel loops appear nonobstructed.  Stable lipoma in

the transverse duodenum.  Again mild diffuse scattered colonic fecal debris

throughout and mild colonic diverticulosis.  Previous appendectomy,

cholecystectomy, and hysterectomy.  No free fluid/air.  Spleen remains

enlarged today measuring 13.5 cm in greatest axial dimension again with

calcified splenic granulomas.



Remaining liver, pancreas, spleen, adrenal glands, kidneys, ureters, and

bladder appear unremarkable for noncontrast exam.  Again mild aortoiliac

calcifications without AAA.



Osseous structures again demonstrate degenerative changes throughout the

spine, scoliosis, and posterior L3-L4 fusion.



Impression:

1.  Again mild fecal stasis without obstruction and colonic diverticulosis.

Stable duodenal lipoma.

2   Again incidental splenomegaly with calcified granulomas.

3.  No acute intra-abdominal/pelvic abnormalities on this noncontrast exam.

4.  New finding right lung base irregular noncalcified nodule.  Nodule is

smaller with respect to more recent CT chest exam February 12, 2018.



Comment: Preliminary interpretation was made by C.  No critical discrepancy.



CT DI 27.88

## 2018-04-25 NOTE — ERPHSYRPT
- History of Present Illness


Time Seen by Provider: 04/25/18 01:51


Historian: patient, family


Exam Limitations: no limitations


Patient Subjective Stated Complaint: pt states she has been having rt sided abd 

pain and nausea. states she took her keytruda on friday and was told it could 

cause abd pain and liver problems


Triage Nursing Assessment: pt alert and oriented, asnwers questions approp. pt 

ambulatre from wheelchair to stretcher in room, slow steady gait noted. 

respirations nonlabored with lungs cta. swelling noted to bilat lower ext, 

worse on lt- pt states normal for her. abd soft, tender to rt side with light 

palpation. hypo bowel sounds.


Physician History: 





patient was recently diagnosed with small cell lung CA in 2/2018.  Patient had 

10 radiation doses and is currently taking iminosuppressant therapy.  patient 

presents with right upper abdominal pain that began at approximately 8 PM last 

night, while she was lying.  The patient did have some sausage and sauerkraut 

for dinner.  Patient described pain as sharp, constant, and right upper 

quadrant without radiation and associated with nausea/dry heaving.  Patient 

denies any vomiting,  fever, cough, chest pain, shortness of breath, back pain 

or any urinary symptoms.  Patient did have one episode of diarrhea, which was 

normal for her from taking her meds.  Patient denies taking any meds for her 

discomfort.  Denies any activity that worsens or improve symptoms.


Timing/Duration: yesterday (8PM), constant, sudden, worse


Activities at Onset: rest


Quality: sharpness


Abdominal Pain Onset Location: RUQ


Pain Radiation: no radiation


Severity of Pain-Max: moderate


Severity of Pain-Current: moderate


Modifying Factors: Improves With: nothing


Associated Symptoms: nausea, weakness, No diaphoresis, No diarrhea, No fever/

chills, No headache, No heartburn, No loss of appetite, No neck pain, No 

shortness of breath, No syncope, No vomiting


Previous symptoms: same symptoms as today


Allergies/Adverse Reactions: 








doxycycline Allergy (Intermediate, Verified 04/25/18 01:52)


 Swelling


Iodinated Contrast- Oral and IV Dye Allergy (Intermediate, Verified 04/25/18 01:

52)


 Hives


 hives, rash, diff breathing 


belladonna alkaloids [From B & O 16-A Supprette] Allergy (Mild, Verified 04/25/ 18 01:52)


 CHILD ALLERGY


cephalexin monohydrate [From Keflex] Allergy (Mild, Verified 04/25/18 01:52)


 Hives


furosemide [From Lasix] Allergy (Mild, Verified 04/25/18 01:52)


 HEART ARTHYM


prochlorperazine edisylate [From Compazine] Allergy (Mild, Verified 04/25/18 01:

52)


 HALLUCINATIONS


prochlorperazine maleate [From Compazine] Allergy (Mild, Verified 04/25/18 01:52

)


 HALLUCINATIONS


Sulfa (Sulfonamide Antibiotics) [Sulfa(Sulfonamide Antibiotics)] Allergy (Mild, 

Verified 04/25/18 01:52)


 Hives


codeine [Codeine] Adverse Reaction (Mild, Verified 04/25/18 01:52)


 Headache


hydrocodone bitartrate [From Vicodin] Adverse Reaction (Mild, Verified 04/25/18 

01:52)


 Headache


ketorolac tromethamine [From Toradol] Adverse Reaction (Mild, Verified 04/25/18 

01:52)


 Vomiting


metformin HCl [From Glucophage] Adverse Reaction (Mild, Verified 04/25/18 01:52)


 Vomiting


naproxen [From Naprosyn] Adverse Reaction (Mild, Verified 04/25/18 01:52)


 Headache


opium *RETIRED-04/24/13 [From B & O 16-A Supprette] Adverse Reaction (Mild, 

Verified 09/30/17 17:09)


 DON'T LIKE THEM


phenytoin sodium [From Dilantin] Adverse Reaction (Mild, Verified 04/25/18 01:52

)


 FEEL DRUNK


phenytoin sodium extended [From Dilantin] Adverse Reaction (Mild, Verified 04/25 /18 01:52)


 FEEL DRUNK


Lactobacillus acidophilus [From Acidophilus] Adverse Reaction (Verified 04/25/ 18 01:52)


 


diuretics Allergy (Intermediate, Uncoded 04/25/18 01:52)


 HEART ARRHYTHMIAS


 pt states she is allergic to all diuretics. pt has cardiac arrhythmias 


influenza vaccine Adverse Reaction (Uncoded 04/25/18 01:52)


 





Home Medications: 








Amlodipine Besylate 5 mg*** [Norvasc 5 mg***] 2.5 mg PO DAILY 09/02/17 [History]


Carvedilol 12.5 mg*** [Coreg 12.5 mg***] 12.5 mg PO BID 09/02/17 [History]


Fluoxetine HCl 20 mg*** [Prozac 20 MG***] 20 mg PO DAILY 09/02/17 [History]


Fluticasone/Salmeterol [Advair 250-50 Diskus] 1 each IH DAILY 09/02/17 [History]


Insulin Detemir [Levemir] 45 unit SQ DAILY 09/02/17 [History]


Levothyroxine Sodium 25 Mcg*** [Synthroid 25 Mcg***] 25 mcg PO DAILY 09/02/17 [

History]


Loratadine 10 mg*** [Claritin 10 mg***] 10 mg PO DAILY 09/02/17 [History]


Lutein 10 mg PO DAILY 09/02/17 [History]


Metolazone 2.5 mg** [Zaroxolyn 2.5 MG**] 2.5 mg PO DAILY PRN PRN 09/02/17 [

History]


Omeprazole 20 MG [Prilosec 20 mg] 20 mg PO DAILY 09/02/17 [History]


Potassium Chloride 10 Meq Tab* [Klor Con 10 MEQ***] 20 meq PO BID 09/02/17 [

History]


Lisinopril 20 mg*** [Zestril 20 MG***] 10 mg PO BID 02/12/18 [History]


Multivitamin [Multivitamins] 1 each PO DAILY 02/12/18 [History]


Nitroglycerin 0.4 mg Tablet*** [Nitrostat 0.4 MG Tablet***] 0.4 mg SL UD 02/12/ 18 [History]


Brevitol 1 neb .ROUTE 04/25/18 [History]


Fluticasone Propionate [Flonase Allergy Relief] 2 spray NS DAILY 04/25/18 [

History]





Hx Tetanus, Diphtheria Vaccination/Date Given: No


Hx Influenza Vaccination/Date Given: No


Hx Pneumococcal Vaccination/Date Given: No


Immunizations Up to Date: No





- Review of Systems


Constitutional: Weakness, No Fever, No Chills


Eyes: No Symptoms


Ears, Nose, & Throat: No Symptoms


Respiratory: No Cough, No Dyspnea


Cardiac: No Symptoms, No Chest Pain, No Edema, No Syncope


Abdominal/Gastrointestinal: Abdominal Pain, Nausea, No Vomiting, No Diarrhea, 

No Constipation, No Hematemesis, No Hematochezia, No Melena


Genitourinary Symptoms: No Symptoms, No Dysuria


Musculoskeletal: No Symptoms, No Back Pain, No Neck Pain


Skin: No Symptoms, No Rash


Neurological: No Symptoms, No Dizziness, No Focal Weakness, No Sensory Changes


Psychological: No Symptoms


Endocrine: No Symptoms


All Other Systems: Reviewed and Negative





- Past Medical History


Pertinent Past Medical History: Yes


Neurological History: No Pertinent History


ENT History: Macular Degeneration


Cardiac History: Congestive Heart Failure, Hypertension


Respiratory History: Asthma, Bronchitis, COPD, Emphysema, Lung Cancer


Endocrine Medical History: Diabetes Type II, Hypothyroidism


Musculoskeletal History: Arthritis, Osteoarthritis


GI Medical History: GERD


 History: No Pertinent History


Psycho-Social History: Depression


Female Reproductive Disorders: Breast Cancer, Cervical Cancer


Other Medical History: CANCERS ALL IN REMISSION





- Past Surgical History


Past Surgical History: Yes


Neuro Surgical History: No Pertinent History


Cardiac: Cardiac Catheterization


Respiratory: Lobectomy


Gastrointestinal: Appendectomy, Cholecystectomy, Hemorrhoidectomy


Genitourinary: No Pertinent History


Musculoskeletal: Orthopedic Surgery


Female Surgical History: Hysterectomy, Tubal Ligation, Other


Other Surgical History: MASTECTOMY RT  BREAST,BREAST CA,LUNG RT MIDDLE LOBE 

REMOVED,NISSON FLUNDOPLASTY,KNEE BACL SURG, shoulder surgery





- Social History


Smoking Status: Former smoker


How long have you smoked: 5


Exposure to second hand smoke: No


Drug Use: none


Patient Lives Alone: No





- Nursing Vital Signs


Nursing Vital Signs: 


 Initial Vital Signs











Temperature  99.0 F   04/25/18 01:24


 


Pulse Rate  95 H  04/25/18 01:24


 


Respiratory Rate  18   04/25/18 01:24


 


Blood Pressure  128/90   04/25/18 01:24


 


O2 Sat by Pulse Oximetry  97   04/25/18 01:24








 Pain Scale











Pain Intensity                 4

















- Physical Exam


General Appearance: no apparent distress, alert


Eye Exam: PERRL/EOMI, eyes nml inspection


Ears, Nose, Throat Exam: normal ENT inspection, pharynx normal, moist mucous 

membranes


Neck Exam: normal inspection, non-tender, supple, full range of motion


Respiratory Exam: normal breath sounds, lungs clear, No respiratory distress


Cardiovascular Exam: regular rate/rhythm, normal heart sounds


Gastrointestinal/Abdomen Exam: soft, normal bowel sounds, tenderness (epigastric

/RUQ area with mild tenderness), No mass


Pelvic Exam: not done


Rectal Exam: deferred


Back Exam: normal inspection, normal range of motion, No CVA tenderness, No 

vertebral tenderness


Extremity Exam: normal inspection, normal range of motion, pelvis stable


Neurologic Exam: alert, oriented x 3, cooperative, normal mood/affect, nml 

cerebellar function, sensation nml, No motor deficits


Skin Exam: normal color, warm, dry


SpO2: 97


Oxygen Delivery: Room Air





- Course


Nursing assessment & vital signs reviewed: Yes


EKG Interpreted by Me: RATE ([id number]), Sinus Rhythm, NORMAL AXIS, NORMAL INTERVALS, 

NORMAL QRS, Non-specific ST Changes





- CT Exams


  ** Abdomen/Pelvis


CT Interpretation: Tele-radiologist Report, Other (new small right pulmonary 

nodule, diverticulosis, fatty infiltration of liver and pedunculated duodenal 

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


Ordered Tests: 


 Active Orders 24 hr











 Category Date Time Status


 


 Clean Catch Urine Specimen STAT Care  04/25/18 01:49 Active


 


 EKG-ER Only STAT Care  04/25/18 01:49 Active


 


 IV Insertion STAT Care  04/25/18 01:49 Active


 


 ABDOMEN AND PELVIS W/0 CONTRAS [CT] Stat Exams  04/25/18 01:50 Taken


 


 AMYLASE Stat Lab  04/25/18 02:07 Completed


 


 CBC W DIFF Stat Lab  04/25/18 02:07 Completed


 


 CMP Stat Lab  04/25/18 02:07 Completed


 


 CULTURE,URINE Stat Lab  04/25/18 03:15 Received


 


 LIPASE Stat Lab  04/25/18 02:07 Completed


 


 UA W/ MICROSCOPIC Stat Lab  04/25/18 03:15 Completed








Medication Summary














Discontinued Medications














Generic Name Dose Route Start Last Admin





  Trade Name Chadq  PRN Reason Stop Dose Admin


 


Famotidine  20 mg  04/25/18 01:49  04/25/18 02:00





  Pepcid 20 Mg Vial***  IV  04/25/18 01:50  20 mg





  STAT ONE   Administration


 


Famotidine  Confirm  04/25/18 01:54  





  Pepcid 20 Mg Vial***  Administered  04/25/18 01:55  





  Dose   





  20 mg   





  IV   





  .STK-MED ONE   


 


Morphine Sulfate  2 mg  04/25/18 01:49  04/25/18 01:59





  Morphine Sulfate 2 Mg Inj***  IV  04/25/18 01:50  2 mg





  STAT ONE   Administration


 


Morphine Sulfate  Confirm  04/25/18 01:54  





  Morphine Sulfate 2 Mg Inj***  Administered  04/25/18 01:55  





  Dose   





  2 mg   





  .ROUTE   





  .STK-MED ONE   


 


Ondansetron HCl  4 mg  04/25/18 01:49  04/25/18 01:59





  Zofran 4 Mg/2 Ml Vial**  IV  04/25/18 01:50  4 mg





  STAT ONE   Administration


 


Ondansetron HCl  Confirm  04/25/18 01:54  





  Zofran 4 Mg/2 Ml Vial**  Administered  04/25/18 01:55  





  Dose   





  4 mg   





  .ROUTE   





  .STK-MED ONE   











Lab/Rad Data: 


 Laboratory Result Diagrams





 04/25/18 02:07 





 04/25/18 02:07 





 Laboratory Results











  04/25/18 04/25/18 04/25/18 Range/Units





  03:15 02:07 02:07 


 


WBC    8.2  (4.0-10.5)  K/mm3


 


RBC    5.28  (4.1-5.4)  M/mm3


 


Hgb    15.2  (12.0-16.0)  gm/dl


 


Hct    45.3  (35-47)  %


 


MCV    85.8  ()  fl


 


MCH    28.8  (26-32)  pg


 


MCHC    33.6  (32-36)  g/dl


 


RDW    13.6  (11.5-14.0)  %


 


Plt Count    131 L  (150-450)  K/mm3


 


MPV    11.0 H  (6-9.5)  fl


 


Gran %    75.4 H  (36.0-66.0)  %


 


Eos # (Auto)    0.28  (0-0.5)  


 


Absolute Lymphs (auto)    1.04  (1.0-4.6)  


 


Absolute Monos (auto)    0.66  (0.0-1.3)  


 


Lymphocytes %    12.7 L  (24.0-44.0)  %


 


Monocytes %    8.1  (0.0-12.0)  %


 


Eosinophils %    3.4  (0.00-5.0)  %


 


Basophils %    0.4  (0.0-0.4)  %


 


Absolute Granulocytes    6.16  (1.4-6.9)  


 


Basophils #    0.03  (0-0.4)  


 


Sodium   134 L   (137-145)  mmol/L


 


Potassium   3.5   (3.5-5.1)  mmol/L


 


Chloride   98   ()  mmol/L


 


Carbon Dioxide   29   (22-30)  mmol/L


 


Anion Gap   9.6   (5-15)  MEQ/L


 


BUN   8   (7-17)  mg/dL


 


Creatinine   0.60   (0.52-1.04)  mg/dL


 


Estimated GFR   > 60.0   ML/MIN


 


Glucose   108 H   ()  mg/dL


 


Calcium   9.9   (8.4-10.2)  mg/dL


 


Total Bilirubin   0.80   (0.2-1.3)  mg/dL


 


AST   12 L   (14-36)  U/L


 


ALT   15   (0-35)  U/L


 


Alkaline Phosphatase   65   ()  U/L


 


Serum Total Protein   6.1 L   (6.3-8.2)  g/dL


 


Albumin   3.4 L   (3.5-5.0)  g/dL


 


Amylase   70   ()  U/L


 


Lipase   35   ()  U/L


 


Ur Collection Type  VOID    


 


Urine Color  YELLOW    (YELLOW)  


 


Urine Appearance  CLEAR    (CLEAR)  


 


Urine pH  8.0    (5-6)  


 


Ur Specific Gravity  1.010    (1.005-1.025)  


 


Urine Protein  NEGATIVE    (Negative)  


 


Urine Ketones  NEGATIVE    (NEGATIVE)  


 


Urine Blood  TRACE NON-HEM    (0-5)  William/ul


 


Urine Nitrite  NEGATIVE    (NEGATIVE)  


 


Urine Bilirubin  NEGATIVE    (NEGATIVE)  


 


Urine Urobilinogen  NORMAL    (0-1)  mg/dL


 


Ur Leukocyte Esterase  1+    (NEGATIVE)  


 


Urine Microscopic RBC  2-5    (0-2)  /HPF


 


Urine Microscopic WBC  5-10    (0-5)  /HPF


 


Ur Epithelial Cells  MODERATE    (FEW)  /HPF


 


Urine Bacteria  FEW    (NEGATIVE)  /HPF


 


Urine Mucus  SLIGHT    (NEGATIVE)  /HPF


 


Urine Culture Reflexed  YES    (NO)  


 


Urine Glucose  NEGATIVE    (NEGATIVE)  mg/dL


 


Specimen Received  4/25/18 0315    














- Progress


Progress: improved


Progress Note: 





04/25/18 01:58


review of patient IV Pepcid, Zofran and morphine for discomfort


04/25/18 03:28


States she feels better and wants to go home.  Patient instructed to follow-up 

with her oncologist Dr. Ye


04/25/18 03:29





Counseled pt/family regarding: lab results, diagnosis, need for follow-up, rad 

results





- Departure


Time of Disposition: 03:29


Departure Disposition: Home


Clinical Impression: 


 Abdominal pain, UTI (urinary tract infection)





Condition: Stable


Critical Care Time: No


Referrals: 


NANCY BACA MD [Primary Care Provider] - 


Instructions:  Acute Abdomen (Belly Pain), Adult (DC), Urinary Tract Infection, 

Adult (DC)


Additional Instructions: 


RX: Zofran/Levaquin


Call Dr. Ye for appointment to be seen in 1-2 days. 


Drink plenty of fluids 


Return for worse abdominal pain, vomiting, fever, diarrhea, dizziness, weakness 

or any problems


Prescriptions: 


Levofloxacin [Levaquin] 500 mg PO DAILY 4 Days #4 tablet


Ondansetron [Zofran Odt] 4 mg PO Q6-8HPRN PRN #10 tab.rapdis


 PRN Reason: Nausea/Vomiting

## 2018-06-21 ENCOUNTER — HOSPITAL ENCOUNTER (INPATIENT)
Dept: HOSPITAL 33 - ED | Age: 78
LOS: 3 days | Discharge: NURSING, REHAB OR LONG-TERM CARE | DRG: 391 | End: 2018-06-24
Attending: INTERNAL MEDICINE | Admitting: INTERNAL MEDICINE
Payer: MEDICARE

## 2018-06-21 DIAGNOSIS — R11.10: ICD-10-CM

## 2018-06-21 DIAGNOSIS — R19.7: ICD-10-CM

## 2018-06-21 DIAGNOSIS — J45.909: ICD-10-CM

## 2018-06-21 DIAGNOSIS — Z85.118: ICD-10-CM

## 2018-06-21 DIAGNOSIS — E03.9: ICD-10-CM

## 2018-06-21 DIAGNOSIS — F32.9: ICD-10-CM

## 2018-06-21 DIAGNOSIS — Z79.899: ICD-10-CM

## 2018-06-21 DIAGNOSIS — C34.90: ICD-10-CM

## 2018-06-21 DIAGNOSIS — J18.9: ICD-10-CM

## 2018-06-21 DIAGNOSIS — M19.90: ICD-10-CM

## 2018-06-21 DIAGNOSIS — I10: ICD-10-CM

## 2018-06-21 DIAGNOSIS — E87.6: ICD-10-CM

## 2018-06-21 DIAGNOSIS — Z85.41: ICD-10-CM

## 2018-06-21 DIAGNOSIS — Z85.3: ICD-10-CM

## 2018-06-21 DIAGNOSIS — J44.9: ICD-10-CM

## 2018-06-21 DIAGNOSIS — R10.9: Primary | ICD-10-CM

## 2018-06-21 DIAGNOSIS — I50.9: ICD-10-CM

## 2018-06-21 DIAGNOSIS — K21.9: ICD-10-CM

## 2018-06-21 DIAGNOSIS — E11.9: ICD-10-CM

## 2018-06-21 LAB
ALBUMIN SERPL-MCNC: 3.1 G/DL (ref 3.5–5)
ALP SERPL-CCNC: 114 U/L (ref 38–126)
ALT SERPL-CCNC: 50 U/L (ref 0–35)
AMYLASE SERPL-CCNC: < 30 U/L (ref 30–110)
ANION GAP SERPL CALC-SCNC: 13.3 MEQ/L (ref 5–15)
AST SERPL QL: 67 U/L (ref 14–36)
BASOPHILS # BLD AUTO: 0.02 10*3/UL (ref 0–0.4)
BASOPHILS NFR BLD AUTO: 0.1 % (ref 0–0.4)
BILIRUB BLD-MCNC: 1.2 MG/DL (ref 0.2–1.3)
BUN SERPL-MCNC: 12 MG/DL (ref 7–17)
CALCIUM SPEC-MCNC: 8.2 MG/DL (ref 8.4–10.2)
CELLS COUNTED: 100
CHLORIDE SERPL-SCNC: 97 MMOL/L (ref 98–107)
CO2 SERPL-SCNC: 27 MMOL/L (ref 22–30)
CREAT SERPL-MCNC: 0.59 MG/DL (ref 0.52–1.04)
EOSINOPHIL # BLD AUTO: 0.02 10*3/UL (ref 0–0.5)
GLUCOSE SERPL-MCNC: 108 MG/DL (ref 74–106)
GLUCOSE UR-MCNC: NEGATIVE MG/DL
GRANULOCYTES # BLD AUTO: 17.15 10*3/UL (ref 1.4–6.9)
HCT VFR BLD AUTO: 40.8 % (ref 35–47)
HGB BLD-MCNC: 14.3 GM/DL (ref 12–16)
LYMPHOCYTES # SPEC AUTO: 1.16 10*3/UL (ref 1–4.6)
MANUAL DIF COMMENT BLD-IMP: NORMAL
MCH RBC QN AUTO: 29.9 PG (ref 26–32)
MCHC RBC AUTO-ENTMCNC: 35 G/DL (ref 32–36)
MONOCYTES # BLD AUTO: 1.5 10*3/UL (ref 0–1.3)
NEUTROPHILS NFR BLD AUTO: 86.4 % (ref 36–66)
NEUTS BAND # BLD MANUAL: 1 % (ref 0–2)
PLATELET # BLD AUTO: 253 K/MM3 (ref 150–450)
POTASSIUM SERPLBLD-SCNC: 3 MMOL/L (ref 3.5–5.1)
PROT SERPL-MCNC: 5.9 G/DL (ref 6.3–8.2)
PROT UR STRIP-MCNC: (no result) MG/DL
RBC # BLD AUTO: 4.78 M/MM3 (ref 4.1–5.4)
RBC # URNS HPF: (no result) /HPF (ref 0–2)
SODIUM SERPL-SCNC: 134 MMOL/L (ref 137–145)
WBC # BLD AUTO: 19.9 K/MM3 (ref 4–10.5)
WBC URNS QL MICRO: (no result) /HPF (ref 0–5)

## 2018-06-21 PROCEDURE — 82272 OCCULT BLD FECES 1-3 TESTS: CPT

## 2018-06-21 PROCEDURE — 81000 URINALYSIS NONAUTO W/SCOPE: CPT

## 2018-06-21 PROCEDURE — 94760 N-INVAS EAR/PLS OXIMETRY 1: CPT

## 2018-06-21 PROCEDURE — 85025 COMPLETE CBC W/AUTO DIFF WBC: CPT

## 2018-06-21 PROCEDURE — 74022 RADEX COMPL AQT ABD SERIES: CPT

## 2018-06-21 PROCEDURE — 99285 EMERGENCY DEPT VISIT HI MDM: CPT

## 2018-06-21 PROCEDURE — 36415 COLL VENOUS BLD VENIPUNCTURE: CPT

## 2018-06-21 PROCEDURE — 96374 THER/PROPH/DIAG INJ IV PUSH: CPT

## 2018-06-21 PROCEDURE — 87086 URINE CULTURE/COLONY COUNT: CPT

## 2018-06-21 PROCEDURE — 71045 X-RAY EXAM CHEST 1 VIEW: CPT

## 2018-06-21 PROCEDURE — 82150 ASSAY OF AMYLASE: CPT

## 2018-06-21 PROCEDURE — 85027 COMPLETE CBC AUTOMATED: CPT

## 2018-06-21 PROCEDURE — 80053 COMPREHEN METABOLIC PANEL: CPT

## 2018-06-21 PROCEDURE — 94640 AIRWAY INHALATION TREATMENT: CPT

## 2018-06-21 PROCEDURE — 94150 VITAL CAPACITY TEST: CPT

## 2018-06-21 PROCEDURE — 96360 HYDRATION IV INFUSION INIT: CPT

## 2018-06-21 PROCEDURE — P9612 CATHETERIZE FOR URINE SPEC: HCPCS

## 2018-06-21 PROCEDURE — 96375 TX/PRO/DX INJ NEW DRUG ADDON: CPT

## 2018-06-21 PROCEDURE — 96365 THER/PROPH/DIAG IV INF INIT: CPT

## 2018-06-21 RX ADMIN — INSULIN GLARGINE SCH: 100 INJECTION, SOLUTION SUBCUTANEOUS at 23:33

## 2018-06-21 RX ADMIN — CLINDAMYCIN HYDROCHLORIDE SCH: 150 CAPSULE ORAL at 23:34

## 2018-06-21 RX ADMIN — POTASSIUM CHLORIDE AND SODIUM CHLORIDE SCH MLS/HR: 900; 150 INJECTION, SOLUTION INTRAVENOUS at 15:43

## 2018-06-21 RX ADMIN — ONDANSETRON PRN MG: 2 INJECTION, SOLUTION INTRAMUSCULAR; INTRAVENOUS at 22:15

## 2018-06-21 RX ADMIN — POTASSIUM CHLORIDE SCH: 10 TABLET, EXTENDED RELEASE ORAL at 23:33

## 2018-06-21 RX ADMIN — OXYCODONE HYDROCHLORIDE AND ACETAMINOPHEN PRN TAB: 5; 325 TABLET ORAL at 22:16

## 2018-06-21 RX ADMIN — CARVEDILOL SCH: 12.5 TABLET, FILM COATED ORAL at 23:32

## 2018-06-21 RX ADMIN — FLUTICASONE PROPIONATE AND SALMETEROL XINAFOATE SCH PUFF: 115; 21 AEROSOL, METERED RESPIRATORY (INHALATION) at 19:43

## 2018-06-21 RX ADMIN — IPRATROPIUM BROMIDE AND ALBUTEROL SULFATE SCH ML: .5; 3 SOLUTION RESPIRATORY (INHALATION) at 19:43

## 2018-06-21 RX ADMIN — HYDROCHLOROTHIAZIDE SCH: 25 TABLET ORAL at 23:34

## 2018-06-21 NOTE — ERPHSYRPT
- History of Present Illness


Time Seen by Provider: 06/21/18 14:29


Historian: patient


Exam Limitations: no limitations


Patient Subjective Stated Complaint: pt arrived per ambulance from home for n/v 

for 10 days now, with pain all over, having loose stools as well. pt has hx of 

stage 4 lung cancer


Triage Nursing Assessment: pt alert, resp easy, appears weak, skin w/d/p.abd 

soft, has pain all over, moves all ext well


Physician History: 





patient with history of terminal stage IV lung CA, presents with abdominal pain

, vomiting and diarrhea.  Patient's lung CA was diagnosed in February 2018 

inpatient has been through 10 radiation and  2 chemotherapies.  Patient's 

currently on hospice.  Patient was brought by EMS to ED today due to crampy 

generalized abdominal pain, intermittent and localized for the past 6-10 days.  

Patient also with vomiting and diarrhea episodes, approximately 5-7 times a 

day.  Patient unable to keep down any meds, solids or liquids.  Patient states 

that there is some blood in diarrhea stools.  Patient also with dizziness and 

weakness.  Patient still with chronic cough with hemoptysis at times.  Patient 

denies any recent fever, chills, chest/back pain or urinary symptoms.  Patient 

did take some morphine prior to arrival to ED


Timing/Duration: day(s) (6-10), intermittent


Activities at Onset: none


Quality: cramping


Abdominal Pain Onset Location: generalized abdomen


Pain Radiation: no radiation


Severity of Pain-Max: moderate


Severity of Pain-Current: mild


Modifying Factors: Improves With: eating (worsens), vomiting (improves)


Associated Symptoms: diarrhea, loss of appetite, nausea, shortness of breath, 

vomiting, weakness, No chest pain, No diaphoresis, No fever/chills, No syncope


Previous symptoms: same symptoms as today


Allergies/Adverse Reactions: 








doxycycline Allergy (Intermediate, Verified 06/21/18 14:33)


 Swelling


Iodinated Contrast- Oral and IV Dye Allergy (Intermediate, Verified 06/21/18 14:

33)


 Hives


 hives, rash, diff breathing 


belladonna alkaloids [From B & O 16-A Supprette] Allergy (Mild, Verified 06/21/ 18 14:33)


 CHILD ALLERGY


cephalexin monohydrate [From Keflex] Allergy (Mild, Verified 06/21/18 14:33)


 Hives


furosemide [From Lasix] Allergy (Mild, Verified 06/21/18 14:33)


 HEART ARTHYM


prochlorperazine edisylate [From Compazine] Allergy (Mild, Verified 06/21/18 14:

33)


 HALLUCINATIONS


prochlorperazine maleate [From Compazine] Allergy (Mild, Verified 06/21/18 14:33

)


 HALLUCINATIONS


Sulfa (Sulfonamide Antibiotics) [Sulfa(Sulfonamide Antibiotics)] Allergy (Mild, 

Verified 06/21/18 14:33)


 Hives


codeine [Codeine] Adverse Reaction (Mild, Verified 06/21/18 14:33)


 Headache


hydrocodone bitartrate [From Vicodin] Adverse Reaction (Mild, Verified 06/21/18 

14:33)


 Headache


ketorolac tromethamine [From Toradol] Adverse Reaction (Mild, Verified 06/21/18 

14:33)


 Vomiting


metformin HCl [From Glucophage] Adverse Reaction (Mild, Verified 06/21/18 14:33)


 Vomiting


naproxen [From Naprosyn] Adverse Reaction (Mild, Verified 06/21/18 14:33)


 Headache


opium *RETIRED-04/24/13 [From B & O 16-A Supprette] Adverse Reaction (Mild, 

Verified 06/21/18 14:33)


 DON'T LIKE THEM


phenytoin sodium [From Dilantin] Adverse Reaction (Mild, Verified 06/21/18 14:33

)


 FEEL DRUNK


phenytoin sodium extended [From Dilantin] Adverse Reaction (Mild, Verified 06/21 /18 14:33)


 FEEL DRUNK


Lactobacillus acidophilus [From Acidophilus] Adverse Reaction (Verified 06/21/ 18 14:33)


 


diuretics Allergy (Intermediate, Uncoded 06/21/18 14:33)


 HEART ARRHYTHMIAS


 pt states she is allergic to all diuretics. pt has cardiac arrhythmias 


influenza vaccine Adverse Reaction (Uncoded 06/21/18 14:33)


 





Home Medications: 








Amlodipine Besylate 5 mg*** [Norvasc 5 mg***] 2.5 mg PO DAILY 09/02/17 [History]


Carvedilol 12.5 mg*** [Coreg 12.5 mg***] 12.5 mg PO BID 09/02/17 [History]


Fluoxetine HCl 20 mg*** [Prozac 20 MG***] 20 mg PO DAILY 09/02/17 [History]


Fluticasone/Salmeterol [Advair 250-50 Diskus] 1 each IH DAILY 09/02/17 [History]


Insulin Detemir [Levemir] 45 unit SQ DAILY 09/02/17 [History]


Levothyroxine Sodium 25 Mcg*** [Synthroid 25 Mcg***] 25 mcg PO DAILY 09/02/17 [

History]


Loratadine 10 mg*** [Claritin 10 mg***] 10 mg PO DAILY 09/02/17 [History]


Lutein 10 mg PO DAILY 09/02/17 [History]


Metolazone 2.5 mg** [Zaroxolyn 2.5 MG**] 2.5 mg PO DAILY PRN PRN 09/02/17 [

History]


Omeprazole 20 MG [Prilosec 20 mg] 20 mg PO DAILY 09/02/17 [History]


Potassium Chloride 10 Meq Tab* [Klor Con 10 MEQ***] 20 meq PO BID 09/02/17 [

History]


Lisinopril 20 mg*** [Zestril 20 MG***] 10 mg PO BID 02/12/18 [History]


Multivitamin [Multivitamins] 1 each PO DAILY 02/12/18 [History]


Nitroglycerin 0.4 mg Tablet*** [Nitrostat 0.4 MG Tablet***] 0.4 mg SL UD 02/12/ 18 [History]


Brevitol 1 neb .ROUTE 04/25/18 [History]


Fluticasone Propionate [Flonase Allergy Relief] 2 spray NS DAILY 04/25/18 [

History]





Hx Tetanus, Diphtheria Vaccination/Date Given: No


Hx Influenza Vaccination/Date Given: No


Hx Pneumococcal Vaccination/Date Given: Yes


Immunizations Up to Date: Yes





- Review of Systems


Constitutional: Fatigue, Lethargy, Weakness, No Fever, No Chills


Eyes: No Symptoms


Ears, Nose, & Throat: No Symptoms


Respiratory: Cough (chronic due to lung CA), Dyspnea, Dyspnea on Exertion (CLEMONS)


Cardiac: No Symptoms, No Chest Pain, No Edema, No Palpitations, No Syncope


Abdominal/Gastrointestinal: Abdominal Pain, Nausea, Vomiting, Diarrhea, 

Appetite Changes (decrease), No Hematochezia, No Melena


Genitourinary Symptoms: No Symptoms, No Dysuria


Musculoskeletal: No Symptoms, No Back Pain, No Neck Pain


Skin: No Symptoms, No Rash


Neurological: No Symptoms, No Dizziness, No Focal Weakness, No Sensory Changes


Psychological: No Symptoms


Endocrine: No Symptoms


Hematologic/Lymphatic: No Symptoms


All Other Systems: Reviewed and Negative





- Past Medical History


Pertinent Past Medical History: Yes


Neurological History: No Pertinent History


ENT History: Macular Degeneration


Cardiac History: Congestive Heart Failure, Hypertension


Respiratory History: Asthma, Bronchitis, COPD, Emphysema, Lung Cancer


Endocrine Medical History: Diabetes Type II, Hypothyroidism


Musculoskeletal History: Arthritis, Osteoarthritis


GI Medical History: GERD


 History: No Pertinent History


Psycho-Social History: Depression


Female Reproductive Disorders: Breast Cancer, Cervical Cancer


Other Medical History: CANCERS ALL IN REMISSION





- Past Surgical History


Past Surgical History: Yes


Neuro Surgical History: No Pertinent History


Cardiac: Cardiac Catheterization


Respiratory: Lobectomy


Gastrointestinal: Appendectomy, Cholecystectomy, Hemorrhoidectomy


Genitourinary: No Pertinent History


Musculoskeletal: Orthopedic Surgery


Female Surgical History: Hysterectomy, Tubal Ligation, Other


Other Surgical History: MASTECTOMY RT  BREAST,BREAST CA,LUNG RT MIDDLE LOBE 

REMOVED,NISSON FLUNDOPLASTY,KNEE BACL SURG, shoulder surgery





- Social History


Smoking Status: Former smoker


How long have you smoked: 5


Exposure to second hand smoke: No


Drug Use: none


Patient Lives Alone: Yes





- Female History


Hx Last Menstrual Period: post


Hx Pregnant Now: No





- Nursing Vital Signs


Nursing Vital Signs: 


 Initial Vital Signs











Temperature  100.8 F   06/21/18 14:26


 


Pulse Rate  109 H  06/21/18 14:26


 


Respiratory Rate  18   06/21/18 14:26


 


Blood Pressure  163/83   06/21/18 14:26


 


O2 Sat by Pulse Oximetry  91 L  06/21/18 14:26








 Pain Scale











Pain Intensity                 0

















- Physical Exam


General Appearance: no apparent distress, alert


Eye Exam: PERRL/EOMI, eyes nml inspection


Ears, Nose, Throat Exam: normal ENT inspection, pharynx normal, dry mucous 

membranes


Neck Exam: normal inspection, non-tender, supple, full range of motion


Respiratory Exam: diminished breath sounds, rhonchi, No respiratory distress


Cardiovascular Exam: regular rate/rhythm, normal heart sounds


Gastrointestinal/Abdomen Exam: soft, normal bowel sounds, distention, No 

tenderness, No mass, No guarding, No pulsatile mass, No rebound


Pelvic Exam: not done


Back Exam: normal inspection, normal range of motion, No CVA tenderness, No 

vertebral tenderness


Extremity Exam: normal inspection, normal range of motion, pelvis stable


Neurologic Exam: alert, oriented x 3, cooperative, normal mood/affect, nml 

cerebellar function, sensation nml, No motor deficits


Skin Exam: normal color, warm, dry


**SpO2 Interpretation**: borderline oxygenation


SpO2: 91





- Course


Nursing assessment & vital signs reviewed: Yes





- Radiology Exams


  ** Abdomen


X-ray Interpretation: Interpreted by me, Infiltrates (no free air noted with 

dilated loops of bowel), Pneumonia


Ordered Tests: 


 Active Orders 24 hr











 Category Date Time Status


 


 OBSTR/ACUTE ABDOMEN SERIES Stat Exams  06/21/18 14:40 Completed


 


 AMYLASE Stat Lab  06/21/18 14:45 Completed


 


 CBC W DIFF Stat Lab  06/21/18 14:45 Completed


 


 CMP Stat Lab  06/21/18 14:45 Completed


 


 CULTURE,URINE Stat Lab  06/21/18 15:29 Received


 


 Manual Differential NC Stat Lab  06/21/18 14:45 Completed


 


 Occult Blood,Stool Other Stat Lab  06/21/18 16:12 Uncollected


 


 UA W/ MICROSCOPIC Stat Lab  06/21/18 15:29 Completed








Medication Summary











Generic Name Dose Route Start Last Admin





  Trade Name Freq  PRN Reason Stop Dose Admin


 


Sodium Chloride  1,000 mls @ 250 mls/hr  06/21/18 14:40  06/21/18 14:52





  Sodium Chloride 0.9% 1000 Ml  IV  06/21/18 18:39  250 mls/hr





  .Q4H STA   Administration





     





     





     





     


 


Potassium Chloride/Sodium Chloride  1,000 mls @ 100 mls/hr  06/21/18 15:45  06/ 21/18 15:43





  Sodium Chloride 0.9% W/ 20 Meq Kcl/Liter  IV  07/21/18 15:44  100 mls/hr





  .Q10H ELEUTERIO   Administration





     





     





     





     














Discontinued Medications














Generic Name Dose Route Start Last Admin





  Trade Name Freq  PRN Reason Stop Dose Admin


 


Acetaminophen  650 mg  06/21/18 15:02  06/21/18 15:10





  Tylenol 325 Mg***  PO  06/21/18 15:03  650 mg





  STAT STA   Administration





     





     





     





     


 


Acetaminophen  Confirm  06/21/18 15:03  





  Tylenol 325 Mg***  Administered  06/21/18 15:04  





  Dose   





  650 mg   





  .ROUTE   





  .STK-MED ONE   





     





     





     





     


 


Sodium Chloride  Confirm  06/21/18 14:50  





  Sodium Chloride 0.9% 1000 Ml  Administered  06/21/18 14:51  





  Dose   





  1,000 mls @ ud   





  .ROUTE   





  .STK-MED ONE   





     





     





     





     


 


Potassium Chloride/Sodium Chloride  Confirm  06/21/18 15:30  





  Sodium Chloride 0.9% W/ 20 Meq Kcl/Liter  Administered  06/21/18 15:31  





  Dose   





  1,000 mls @ ud   





  IV   





  .STK-MED ONE   





     





     





     





     


 


Ondansetron HCl  4 mg  06/21/18 14:40  06/21/18 14:51





  Zofran 4 Mg/2 Ml Vial**  IV  06/21/18 14:41  4 mg





  STAT ONE   Administration





     





     





     





     


 


Ondansetron HCl  Confirm  06/21/18 14:50  





  Zofran 4 Mg/2 Ml Vial**  Administered  06/21/18 14:51  





  Dose   





  4 mg   





  .ROUTE   





  .STK-MED ONE   





     





     





     





     


 


Pantoprazole Sodium  40 mg  06/21/18 14:40  06/21/18 14:51





  Protonix 40 Mg Iv***  IV  06/21/18 14:41  40 mg





  STAT ONE   Administration





     





     





     





     


 


Pantoprazole Sodium  Confirm  06/21/18 14:50  





  Protonix 40 Mg Iv***  Administered  06/21/18 14:51  





  Dose   





  40 mg   





  IV   





  .STK-MED ONE   





     





     





     





     











Lab/Rad Data: 


 Laboratory Result Diagrams





 06/21/18 14:45 





 06/21/18 14:45 





 Laboratory Results











  06/21/18 06/21/18 06/21/18 Range/Units





  15:29 14:45 14:45 


 


WBC    19.9 H  (4.0-10.5)  K/mm3


 


RBC    4.78  (4.1-5.4)  M/mm3


 


Hgb    14.3  (12.0-16.0)  gm/dl


 


Hct    40.8  (35-47)  %


 


MCV    85.4  ()  fl


 


MCH    29.9  (26-32)  pg


 


MCHC    35.0  (32-36)  g/dl


 


RDW    13.8  (11.5-14.0)  %


 


Plt Count    253  (150-450)  K/mm3


 


MPV    11.0 H  (6-9.5)  fl


 


Gran %    86.4 H  (36.0-66.0)  %


 


Eos # (Auto)    0.02  (0-0.5)  


 


Absolute Lymphs (auto)    1.16  (1.0-4.6)  


 


Absolute Monos (auto)    1.50 H  (0.0-1.3)  


 


Lymphocytes %    5.8 L  (24.0-44.0)  %


 


Monocytes %    7.6  (0.0-12.0)  %


 


Eosinophils %    0.1  (0.00-5.0)  %


 


Basophils %    0.1  (0.0-0.4)  %


 


Absolute Granulocytes    17.15 H  (1.4-6.9)  


 


Segmented Neutrophils    86 H  (36.0-66.0)  %


 


Band Neutrophils    1  (0.0-2.0)  %


 


Lymphocytes (Manual)    9 L  (24-44)  %


 


Monocytes (Manual)    4  (0.0-12.0)  %


 


Basophils #    0.02  (0-0.4)  


 


Platelet Estimate    NORMAL  (NORMAL)  


 


RBC Morphology    NORMAL  


 


Sodium   134 L   (137-145)  mmol/L


 


Potassium   3.0 L   (3.5-5.1)  mmol/L


 


Chloride   97 L   ()  mmol/L


 


Carbon Dioxide   27   (22-30)  mmol/L


 


Anion Gap   13.3   (5-15)  MEQ/L


 


BUN   12   (7-17)  mg/dL


 


Creatinine   0.59   (0.52-1.04)  mg/dL


 


Estimated GFR   > 60.0   ML/MIN


 


Glucose   108 H   ()  mg/dL


 


Calcium   8.2 L   (8.4-10.2)  mg/dL


 


Total Bilirubin   1.20   (0.2-1.3)  mg/dL


 


AST   67 H   (14-36)  U/L


 


ALT   50 H   (0-35)  U/L


 


Alkaline Phosphatase   114   ()  U/L


 


Serum Total Protein   5.9 L   (6.3-8.2)  g/dL


 


Albumin   3.1 L   (3.5-5.0)  g/dL


 


Amylase   < 30 L   ()  U/L


 


Ur Collection Type  VOID    


 


Urine Color  YELLOW    (YELLOW)  


 


Urine Appearance  CLEAR    (CLEAR)  


 


Urine pH  6.0    (5-6)  


 


Ur Specific Gravity  1.010    (1.005-1.025)  


 


Urine Protein  1+    (Negative)  


 


Urine Ketones  MODERATE    (NEGATIVE)  


 


Urine Blood  250    (0-5)  William/ul


 


Urine Nitrite  NEGATIVE    (NEGATIVE)  


 


Urine Bilirubin  SMALL    (NEGATIVE)  


 


Urine Urobilinogen  4    (0-1)  mg/dL


 


Ur Leukocyte Esterase  TRACE    (NEGATIVE)  


 


Urine Microscopic RBC  0-2    (0-2)  /HPF


 


Urine Microscopic WBC  5-10    (0-5)  /HPF


 


Ur Epithelial Cells  RARE    (FEW)  /HPF


 


Urine Bacteria  FEW    (NEGATIVE)  /HPF


 


Urine Culture Reflexed  YES    (NO)  


 


Urine Glucose  NEGATIVE    (NEGATIVE)  mg/dL


 


Specimen Received  6/21/18 1529    














- Progress


Progress: improved


Progress Note: 





06/21/18 15:00


Pt. given IVF's, Protonix and Zofran.  We will also obtain blood, UA and AAS 

for evaluation.


06/21/18 16:23


patient does feel somewhat better.  We will admit and treat patient with 

Rocephin/Zithromax for pneumonia


Discussed with Dr.: Tima (notified about patient and agreed to accept for 

further care)


Will see patient in: hospital (observation), hospital (full admit)





- Departure


Time of Disposition: 16:22


Departure Disposition: Observation


Clinical Impression: 


 Pneumonia, Hypokalemia, Vomiting and diarrhea





Condition: Stable


Critical Care Time: No


Referrals: 


NANCY BACA MD [Primary Care Provider] -

## 2018-06-21 NOTE — PCM.HP
History of Present Illness





- Chief Complaint


Chief Complaint: abdominal pain, nausea vomiting


History of Present Illness: 


 is a 78 year old female.patient with history of terminal stage IV 

lung CA, presents with abdominal pain, vomiting and diarrhea.  Patient's lung 

CA was diagnosed in February 2018 inpatient has been through 10 radiation and  

2 chemotherapies.  Patient's currently on hospice.  Patient was brought by EMS 

to ED today due to crampy generalized abdominal pain, intermittent and 

localized for the past 6-10 days.  Patient also with vomiting and diarrhea 

episodes, approximately 5-7 times a day.  Patient unable to keep down any meds, 

solids or liquids.  Patient states that there is some blood in diarrhea stools.

  Patient also with dizziness and weakness.  Patient still with chronic cough 

with hemoptysis at times.  Patient denies any recent fever, chills, chest/back 

pain or urinary symptoms.  Patient did take some morphine prior to arrival to ED


Timing/Duration: day(s) (6-10), intermittent


Activities at Onset: none


Quality: cramping


Abdominal Pain Onset Location: generalized abdomen


Pain Radiation: no radiation


Severity of Pain-Max: moderate


Severity of Pain-Current: mild


Modifying Factors: Improves With: eating (worsens), vomiting (improves)


Associated Symptoms: diarrhea, loss of appetite, nausea, shortness of breath, 

vomiting, weakness, No chest pain, No diaphoresis, No fever/chills, No syncope


Previous symptoms: same symptoms as toda








- Review of Systems


Constitutional: Fever, Chills, Malaise, Weakness


Eyes: No Symptoms


Ears, Nose, & Throat: No Symptoms


Respiratory: Cough, Short Of Breath, Wheezing


Cardiac: No Chest Pain, No Edema, No Syncope


Abdominal/Gastrointestinal: Abdominal Pain, Vomiting, Diarrhea, No Nausea


Genitourinary Symptoms: No Dysuria


Musculoskeletal: No Back Pain, No Neck Pain


Skin: No Rash


Neurological: No Dizziness, No Focal Weakness, No Sensory Changes


Psychological: No Symptoms


Endocrine: No Symptoms


Hematologic/Lymphatic: No Symptoms


Immunological/Allergic: No Symptoms





Medications & Allergies


Home Medications: 


 Home Medication List





Amlodipine Besylate 5 mg*** [Norvasc 5 mg***] 2.5 mg PO DAILY 09/02/17 [History 

Confirmed 06/21/18]


Carvedilol 12.5 mg*** [Coreg 12.5 mg***] 12.5 mg PO BID 09/02/17 [History 

Confirmed 06/21/18]


Fluoxetine HCl 20 mg*** [Prozac 20 MG***] 20 mg PO DAILY 09/02/17 [History 

Confirmed 06/21/18]


Insulin Detemir [Levemir] 35 unit SQ HS 09/02/17 [History Confirmed 06/21/18]


Levothyroxine Sodium 25 Mcg*** [Synthroid 25 Mcg***] 25 mcg PO DAILY 09/02/17 [

History Confirmed 06/21/18]


Loratadine 10 mg*** [Claritin 10 mg***] 10 mg PO DAILY 09/02/17 [History 

Confirmed 06/21/18]


Lutein 10 mg PO DAILY 09/02/17 [History Confirmed 06/21/18]


Omeprazole 20 MG [Prilosec 20 mg] 20 mg PO DAILY 09/02/17 [History Confirmed 06/ 21/18]


Potassium Chloride 10 Meq Tab* [Klor Con 10 MEQ***] 20 meq PO BID 09/02/17 [

History Confirmed 06/21/18]


Lisinopril 20 mg*** [Zestril 20 MG***] 10 mg PO BID 02/12/18 [History Confirmed 

06/21/18]


Nitroglycerin 0.4 mg Tablet*** [Nitrostat 0.4 MG Tablet***] 0.4 mg SL UD 02/12/ 18 [History Confirmed 06/21/18]


Fluticasone Propionate [Flonase Allergy Relief] 2 spray NS DAILY 04/25/18 [

History Confirmed 06/21/18]


Ondansetron [Zofran Odt] 4 mg PO Q6-8HPRN PRN #10 tab.rapdis 04/25/18 [Rx 

Confirmed 06/21/18]


Clindamycin HCl [Cleocin HCl] 150 mg PO QID 06/21/18 [History Confirmed 06/21/18

]


Morphine Sulfate 5 mg PO Q2H/PRN PRN 06/21/18 [History Confirmed 06/21/18]


Oxycodone HCl/Acetaminophen [Oxycodone-Acetaminophen 5-325] 1 each PO Q4HPRN 

PRN 06/21/18 [History Confirmed 06/21/18]








Allergies/Adverse Reactions: 


 Allergies











Allergy/AdvReac Type Severity Reaction Status Date / Time


 


doxycycline Allergy Intermediate Swelling Verified 06/21/18 14:33


 


Iodinated Contrast- Oral and Allergy Intermediate Hives Verified 06/21/18 14:33





IV Dye     


 


belladonna alkaloids Allergy Mild CHILD Verified 06/21/18 14:33





[From B & O 16-A Supprette]   ALLERGY  


 


cephalexin monohydrate Allergy Mild Hives Verified 06/21/18 14:33





[From Keflex]     


 


furosemide [From Lasix] Allergy Mild HEART Verified 06/21/18 14:33





   ARTHYM  


 


prochlorperazine edisylate Allergy Mild HALLUCINATI Verified 06/21/18 14:33





[From Compazine]   ONS  


 


prochlorperazine maleate Allergy Mild HALLUCINATI Verified 06/21/18 14:33





[From Compazine]   ONS  


 


Sulfa (Sulfonamide Allergy Mild Hives Verified 06/21/18 14:33





Antibiotics)     





[Sulfa(Sulfonamide     





Antibiotics)]     


 


codeine [Codeine] AdvReac Mild Headache Verified 06/21/18 14:33


 


hydrocodone bitartrate AdvReac Mild Headache Verified 06/21/18 14:33





[From Vicodin]     


 


ketorolac tromethamine AdvReac Mild Vomiting Verified 06/21/18 14:33





[From Toradol]     


 


metformin HCl AdvReac Mild Vomiting Verified 06/21/18 14:33





[From Glucophage]     


 


naproxen [From Naprosyn] AdvReac Mild Headache Verified 06/21/18 14:33


 


opium *RETIRED-04/24/13 AdvReac Mild DON'T LIKE Verified 06/21/18 14:33





[From B & O 16-A Supprette]   THEM  


 


phenytoin sodium AdvReac Mild FEEL DRUNK Verified 06/21/18 14:33





[From Dilantin]     


 


phenytoin sodium extended AdvReac Mild FEEL DRUNK Verified 06/21/18 14:33





[From Dilantin]     


 


Lactobacillus acidophilus AdvReac   Verified 06/21/18 14:33





[From Acidophilus]     


 


diuretics Allergy Intermediate HEART Uncoded 06/21/18 14:33





   ARRHYTHMIAS  


 


influenza vaccine AdvReac   Uncoded 06/21/18 14:33














- Past Medical History


Past Medical History: Yes


Neurological History: No Pertinent History


ENT History: Macular Degeneration


Cardiac History: Congestive Heart Failure, Hypertension


Respiratory History: Asthma, Bronchitis, COPD, Emphysema, Lung Cancer


Endocrine Medical History: Diabetes Type II, Hypothyroidism


Musculoskelatal History: Arthritis, Osteoarthritis


GI Medical History: GERD


 History: No Pertinent History


Pyscho-Social History: Depression


Reproductive Disorders: Breast Cancer, Cervical Cancer


Comment: CANCERS ALL IN REMISSION





- Female History


Hx Last Menstrual Period: post


Are you pregnant now?: No





- Past Surgical History


Past Surgical History: Yes


Neuro Surgical History: No Pertinent History


Cardiac History: Cardiac Catheterization


Respiratory Surgery: Lobectomy


GI Surgical History: Appendectomy, Cholecystectomy, Hemorrhoidectomy


Genitourinary Surgical Hx: No Pertinent History


Musculskeletal Surgical Hx: Orthopedic Surgery


Female Surgical History: Hysterectomy, Tubal Ligation, Other


Other Surgical History: MASTECTOMY RT  BREAST,BREAST CA,LUNG RT MIDDLE LOBE 

REMOVED,NISSON FLUNDOPLASTY,KNEE BACL SURG, shoulder surgery





- Social History


Smoking Status: Former smoker


How long have you smoked: 5


Exposure to second hand smoke: No


Alcohol: None


Drug Use: none





- Physical Exam


Vital Signs: 


 Vital Signs - 24 hr











  Temp Pulse Resp BP Pulse Ox


 


 06/21/18 17:40  98.6 F  96 H  20  139/73  95


 


 06/21/18 16:57   95 H  18  153/75  95


 


 06/21/18 16:24      91 L


 


 06/21/18 15:55  102 F  101 H  18  105/87  96


 


 06/21/18 14:26  100.8 F  109 H  18  163/83  91 L








 Oxygen-Last 24 hours











O2 Percentage                  2 Liters = 28%


 


O2 Percentage                  2 Liters = 28%


 


O2 Percentage                  2 Liters = 28%


 


O2 Percentage                  2 Liters = 28%














General Appearance: mild distress, alert


Neurologic Exam: alert, oriented x 3, cooperative, normal mood/affect, nml 

cerebellar function, nml station & gait, sensation nml, No motor deficits


Eye Exam: PERRL/EOMI, eyes nml inspection


Ears, Nose, Throat Exam: normal ENT inspection, TMs normal, pharynx normal, 

moist mucous membranes


Neck Exam: normal inspection, non-tender, supple, full range of motion


Respiratory Exam: crackles/rales, rhonchi, wheezing, No respiratory distress


Cardiovascular Exam: regular rate/rhythm, normal heart sounds, normal 

peripheral pulses


Gastrointestinal/Abdomen Exam: soft, tenderness, No mass


Back Exam: normal inspection, normal range of motion, No CVA tenderness, No 

vertebral tenderness


Extremity Exam: normal inspection, normal range of motion, pelvis stable


Skin Exam: normal color, warm, dry, No rash


Lymphatic Exam: No adenopathy





Results





- Labs


Lab/Micro Results: 


 Lab Results-Last 24 Hours











  06/21/18 06/21/18 06/21/18 Range/Units





  14:45 14:45 15:29 


 


WBC  19.9 H    (4.0-10.5)  K/mm3


 


RBC  4.78    (4.1-5.4)  M/mm3


 


Hgb  14.3    (12.0-16.0)  gm/dl


 


Hct  40.8    (35-47)  %


 


MCV  85.4    ()  fl


 


MCH  29.9    (26-32)  pg


 


MCHC  35.0    (32-36)  g/dl


 


RDW  13.8    (11.5-14.0)  %


 


Plt Count  253    (150-450)  K/mm3


 


MPV  11.0 H    (6-9.5)  fl


 


Gran %  86.4 H    (36.0-66.0)  %


 


Eos # (Auto)  0.02    (0-0.5)  


 


Absolute Lymphs (auto)  1.16    (1.0-4.6)  


 


Absolute Monos (auto)  1.50 H    (0.0-1.3)  


 


Lymphocytes %  5.8 L    (24.0-44.0)  %


 


Monocytes %  7.6    (0.0-12.0)  %


 


Eosinophils %  0.1    (0.00-5.0)  %


 


Basophils %  0.1    (0.0-0.4)  %


 


Absolute Granulocytes  17.15 H    (1.4-6.9)  


 


Segmented Neutrophils  86 H    (36.0-66.0)  %


 


Band Neutrophils  1    (0.0-2.0)  %


 


Lymphocytes (Manual)  9 L    (24-44)  %


 


Monocytes (Manual)  4    (0.0-12.0)  %


 


Basophils #  0.02    (0-0.4)  


 


Platelet Estimate  NORMAL    (NORMAL)  


 


RBC Morphology  NORMAL    


 


Sodium   134 L   (137-145)  mmol/L


 


Potassium   3.0 L   (3.5-5.1)  mmol/L


 


Chloride   97 L   ()  mmol/L


 


Carbon Dioxide   27   (22-30)  mmol/L


 


Anion Gap   13.3   (5-15)  MEQ/L


 


BUN   12   (7-17)  mg/dL


 


Creatinine   0.59   (0.52-1.04)  mg/dL


 


Estimated GFR   > 60.0   ML/MIN


 


Glucose   108 H   ()  mg/dL


 


Calcium   8.2 L   (8.4-10.2)  mg/dL


 


Total Bilirubin   1.20   (0.2-1.3)  mg/dL


 


AST   67 H   (14-36)  U/L


 


ALT   50 H   (0-35)  U/L


 


Alkaline Phosphatase   114   ()  U/L


 


Serum Total Protein   5.9 L   (6.3-8.2)  g/dL


 


Albumin   3.1 L   (3.5-5.0)  g/dL


 


Amylase   < 30 L   ()  U/L


 


Ur Collection Type    VOID  


 


Urine Color    YELLOW  (YELLOW)  


 


Urine Appearance    CLEAR  (CLEAR)  


 


Urine pH    6.0  (5-6)  


 


Ur Specific Gravity    1.010  (1.005-1.025)  


 


Urine Protein    1+  (Negative)  


 


Urine Ketones    MODERATE  (NEGATIVE)  


 


Urine Blood    250  (0-5)  William/ul


 


Urine Nitrite    NEGATIVE  (NEGATIVE)  


 


Urine Bilirubin    SMALL  (NEGATIVE)  


 


Urine Urobilinogen    4  (0-1)  mg/dL


 


Ur Leukocyte Esterase    TRACE  (NEGATIVE)  


 


Urine Microscopic RBC    0-2  (0-2)  /HPF


 


Urine Microscopic WBC    5-10  (0-5)  /HPF


 


Ur Epithelial Cells    RARE  (FEW)  /HPF


 


Urine Bacteria    FEW  (NEGATIVE)  /HPF


 


Urine Culture Reflexed    YES  (NO)  


 


Urine Glucose    NEGATIVE  (NEGATIVE)  mg/dL


 


Stool Occult Blood     (Negative)  


 


Specimen Received    6/21/18 1529  














  06/21/18 Range/Units





  Unknown 


 


WBC   (4.0-10.5)  K/mm3


 


RBC   (4.1-5.4)  M/mm3


 


Hgb   (12.0-16.0)  gm/dl


 


Hct   (35-47)  %


 


MCV   ()  fl


 


MCH   (26-32)  pg


 


MCHC   (32-36)  g/dl


 


RDW   (11.5-14.0)  %


 


Plt Count   (150-450)  K/mm3


 


MPV   (6-9.5)  fl


 


Gran %   (36.0-66.0)  %


 


Eos # (Auto)   (0-0.5)  


 


Absolute Lymphs (auto)   (1.0-4.6)  


 


Absolute Monos (auto)   (0.0-1.3)  


 


Lymphocytes %   (24.0-44.0)  %


 


Monocytes %   (0.0-12.0)  %


 


Eosinophils %   (0.00-5.0)  %


 


Basophils %   (0.0-0.4)  %


 


Absolute Granulocytes   (1.4-6.9)  


 


Segmented Neutrophils   (36.0-66.0)  %


 


Band Neutrophils   (0.0-2.0)  %


 


Lymphocytes (Manual)   (24-44)  %


 


Monocytes (Manual)   (0.0-12.0)  %


 


Basophils #   (0-0.4)  


 


Platelet Estimate   (NORMAL)  


 


RBC Morphology   


 


Sodium   (137-145)  mmol/L


 


Potassium   (3.5-5.1)  mmol/L


 


Chloride   ()  mmol/L


 


Carbon Dioxide   (22-30)  mmol/L


 


Anion Gap   (5-15)  MEQ/L


 


BUN   (7-17)  mg/dL


 


Creatinine   (0.52-1.04)  mg/dL


 


Estimated GFR   ML/MIN


 


Glucose   ()  mg/dL


 


Calcium   (8.4-10.2)  mg/dL


 


Total Bilirubin   (0.2-1.3)  mg/dL


 


AST   (14-36)  U/L


 


ALT   (0-35)  U/L


 


Alkaline Phosphatase   ()  U/L


 


Serum Total Protein   (6.3-8.2)  g/dL


 


Albumin   (3.5-5.0)  g/dL


 


Amylase   ()  U/L


 


Ur Collection Type   


 


Urine Color   (YELLOW)  


 


Urine Appearance   (CLEAR)  


 


Urine pH   (5-6)  


 


Ur Specific Gravity   (1.005-1.025)  


 


Urine Protein   (Negative)  


 


Urine Ketones   (NEGATIVE)  


 


Urine Blood   (0-5)  William/ul


 


Urine Nitrite   (NEGATIVE)  


 


Urine Bilirubin   (NEGATIVE)  


 


Urine Urobilinogen   (0-1)  mg/dL


 


Ur Leukocyte Esterase   (NEGATIVE)  


 


Urine Microscopic RBC   (0-2)  /HPF


 


Urine Microscopic WBC   (0-5)  /HPF


 


Ur Epithelial Cells   (FEW)  /HPF


 


Urine Bacteria   (NEGATIVE)  /HPF


 


Urine Culture Reflexed   (NO)  


 


Urine Glucose   (NEGATIVE)  mg/dL


 


Stool Occult Blood  NEGATIVE  (Negative)  


 


Specimen Received   














- Radiology Impressions


Radiology Exams & Impressions: 


 Radiology Procedures











 Category Date Time Status


 


 OBSTR/ACUTE ABDOMEN SERIES Stat Exams  06/21/18 14:40 Completed














Assessment/Plan


(1) Pneumonia


Current Visit: Yes   Status: Acute   


Qualifiers: 


   Pneumonia type: due to unspecified organism   Laterality: unspecified 

laterality   Lung location: unspecified part of lung   Qualified Code(s): J18.9 

- Pneumonia, unspecified organism   


Code(s): J18.9 - PNEUMONIA, UNSPECIFIED ORGANISM   





(2) Hypokalemia


Current Visit: Yes   Status: Acute   Code(s): E87.6 - HYPOKALEMIA   





(3) Vomiting and diarrhea


Current Visit: Yes   Status: Acute   Code(s): R11.10 - VOMITING, UNSPECIFIED; 

R19.7 - DIARRHEA, UNSPECIFIED   





(4) Abdominal pain


Current Visit: No   Status: Acute   


Qualifiers: 


   Abdominal location: generalized   Qualified Code(s): R10.84 - Generalized 

abdominal pain   


Code(s): R10.9 - UNSPECIFIED ABDOMINAL PAIN   





(5) Diabetes mellitus type 2


Current Visit: Yes   Status: Chronic   Code(s): E11.9 - TYPE 2 DIABETES 

MELLITUS WITHOUT COMPLICATIONS   





(6) Hypertension


Current Visit: Yes   Status: Chronic   


Qualifiers: 


   Hypertension type: essential hypertension   Qualified Code(s): I10 - 

Essential (primary) hypertension   


Code(s): I10 - ESSENTIAL (PRIMARY) HYPERTENSION

## 2018-06-21 NOTE — XRAY
Indication: Lower abdominal pain.  History lung cancer, cervical cancer, and

right breast cancer.



Comparison: Chest exam February 12, 2018.  Nonacute CT abdomen/pelvis April 25, 2018.



2 views of the abdomen demonstrates nonspecific nonobstructed bowel gas

pattern with epigastric and right upper quadrant surgical clips.  No focal

bowel dilatation or free air.  Solid organs unremarkable.  Osseous structures

intact with multilevel degenerative spondylosis, levorotoscoliosis, and L3-L4

posterior fusion.



Single AP chest demonstrates new diffuse right lung and left upper lung

infiltrates without consolidation or large effusion.  Heart is not enlarged.

Bony thorax intact again with mild dextroscoliosis and right axillary nick

dissection.  New left Port-A-Cath.



Impression:

1.  Nonacute nonobstructed abdomen with chronic features.

2.  New bilateral infiltrates as detailed.  Rule out pneumonia/pneumonitis.

3.  New left Port-A-Cath without complications.

## 2018-06-22 RX ADMIN — FLUTICASONE PROPIONATE AND SALMETEROL XINAFOATE SCH PUFF: 115; 21 AEROSOL, METERED RESPIRATORY (INHALATION) at 18:55

## 2018-06-22 RX ADMIN — POLYETHYLENE GLYCOL 3350 SCH GM: 17 POWDER, FOR SOLUTION ORAL at 16:28

## 2018-06-22 RX ADMIN — PANTOPRAZOLE SODIUM SCH MG: 40 TABLET, DELAYED RELEASE ORAL at 08:14

## 2018-06-22 RX ADMIN — OXYCODONE HYDROCHLORIDE AND ACETAMINOPHEN PRN TAB: 5; 325 TABLET ORAL at 06:14

## 2018-06-22 RX ADMIN — CLINDAMYCIN HYDROCHLORIDE SCH MG: 150 CAPSULE ORAL at 08:30

## 2018-06-22 RX ADMIN — OXYCODONE HYDROCHLORIDE AND ACETAMINOPHEN PRN TAB: 5; 325 TABLET ORAL at 16:42

## 2018-06-22 RX ADMIN — FLUOXETINE SCH MG: 20 CAPSULE ORAL at 08:15

## 2018-06-22 RX ADMIN — ONDANSETRON PRN MG: 2 INJECTION, SOLUTION INTRAMUSCULAR; INTRAVENOUS at 06:14

## 2018-06-22 RX ADMIN — POTASSIUM CHLORIDE AND SODIUM CHLORIDE SCH MLS/HR: 900; 150 INJECTION, SOLUTION INTRAVENOUS at 01:45

## 2018-06-22 RX ADMIN — POTASSIUM CHLORIDE AND SODIUM CHLORIDE SCH MLS/HR: 900; 150 INJECTION, SOLUTION INTRAVENOUS at 21:36

## 2018-06-22 RX ADMIN — ONDANSETRON PRN MG: 2 INJECTION, SOLUTION INTRAMUSCULAR; INTRAVENOUS at 16:28

## 2018-06-22 RX ADMIN — LEVOTHYROXINE SODIUM SCH MCG: 25 TABLET ORAL at 08:14

## 2018-06-22 RX ADMIN — ONDANSETRON PRN MG: 2 INJECTION, SOLUTION INTRAMUSCULAR; INTRAVENOUS at 23:23

## 2018-06-22 RX ADMIN — INSULIN GLARGINE SCH: 100 INJECTION, SOLUTION SUBCUTANEOUS at 22:01

## 2018-06-22 RX ADMIN — POTASSIUM CHLORIDE SCH MEQ: 10 TABLET, EXTENDED RELEASE ORAL at 08:10

## 2018-06-22 RX ADMIN — POTASSIUM CHLORIDE SCH MEQ: 10 TABLET, EXTENDED RELEASE ORAL at 21:05

## 2018-06-22 RX ADMIN — CARVEDILOL SCH MG: 12.5 TABLET, FILM COATED ORAL at 08:10

## 2018-06-22 RX ADMIN — OXYCODONE HYDROCHLORIDE AND ACETAMINOPHEN PRN TAB: 5; 325 TABLET ORAL at 12:28

## 2018-06-22 RX ADMIN — IPRATROPIUM BROMIDE AND ALBUTEROL SULFATE SCH ML: .5; 3 SOLUTION RESPIRATORY (INHALATION) at 06:45

## 2018-06-22 RX ADMIN — FLUTICASONE PROPIONATE SCH GM: 50 SPRAY, METERED NASAL at 08:18

## 2018-06-22 RX ADMIN — HYDROCHLOROTHIAZIDE SCH MG: 25 TABLET ORAL at 08:10

## 2018-06-22 RX ADMIN — OXYCODONE HYDROCHLORIDE AND ACETAMINOPHEN PRN TAB: 5; 325 TABLET ORAL at 21:04

## 2018-06-22 RX ADMIN — HYDROCHLOROTHIAZIDE SCH MG: 25 TABLET ORAL at 21:05

## 2018-06-22 RX ADMIN — AMLODIPINE BESYLATE SCH MG: 5 TABLET ORAL at 08:14

## 2018-06-22 RX ADMIN — IPRATROPIUM BROMIDE AND ALBUTEROL SULFATE SCH ML: .5; 3 SOLUTION RESPIRATORY (INHALATION) at 12:25

## 2018-06-22 RX ADMIN — CARVEDILOL SCH MG: 12.5 TABLET, FILM COATED ORAL at 21:05

## 2018-06-22 RX ADMIN — LORATADINE SCH MG: 10 TABLET ORAL at 08:14

## 2018-06-22 RX ADMIN — FLUTICASONE PROPIONATE AND SALMETEROL XINAFOATE SCH PUFF: 115; 21 AEROSOL, METERED RESPIRATORY (INHALATION) at 06:45

## 2018-06-22 RX ADMIN — IPRATROPIUM BROMIDE AND ALBUTEROL SULFATE SCH ML: .5; 3 SOLUTION RESPIRATORY (INHALATION) at 00:51

## 2018-06-22 RX ADMIN — PROMETHAZINE HYDROCHLORIDE PRN MG: 25 INJECTION INTRAMUSCULAR; INTRAVENOUS at 09:00

## 2018-06-22 RX ADMIN — AZITHROMYCIN DIHYDRATE SCH MLS/HR: 500 INJECTION, POWDER, LYOPHILIZED, FOR SOLUTION INTRAVENOUS at 08:15

## 2018-06-22 NOTE — XRAY
Indication: Short of breath.  Pneumonia.



Comparison: One day earlier.



Portable chest demonstrates slight worsening diffuse right lung and left upper

lung interstitial alveolar opacities that can be better evaluated with CT.

Heart is not enlarged with stable left Port-A-Cath.  Stable postsurgical

changes.

## 2018-06-22 NOTE — PCM.NOTE
Date and Time: 06/22/18  1050





Subjective Assessment: 





still short of breath





- Review of Systems


Constitutional: No Fever, No Chills


Eyes: No Symptoms


Ears, Nose, & Throat: No Symptoms


Respiratory: Cough, Orthopnea, Short Of Breath


Cardiac: No Chest Pain, No Edema, No Syncope


Abdominal/Gastrointestinal: Abdominal Pain, Nausea, Vomiting, No Diarrhea


Genitourinary Symptoms: No Dysuria


Musculoskeletal: No Back Pain, No Neck Pain


Skin: No Rash


Neurological: No Dizziness, No Focal Weakness, No Sensory Changes


Psychological: No Symptoms


Endocrine: No Symptoms


Hematologic/Lymphatic: No Symptoms


Immunological/Allergic: No Symptoms





Objective Exam


General Appearance: no apparent distress, alert


Neurologic Exam: alert, oriented x 3, cooperative, normal mood/affect, nml 

cerebellar function, sensation nml, No motor deficits


Skin Exam: normal color, warm, dry


Eye Exam: PERRL, EOMI, eyes nml inspection


Ears, Nose, Throat Exam: normal ENT inspection, pharynx normal, moist mucous 

membranes


Neck Exam: normal inspection, non-tender, supple, full range of motion


Respiratory Exam: crackles/rales, rhonchi, wheezing, No respiratory distress


Cardiovascular Exam: regular rate/rhythm, normal heart sounds


Gastrointestinal/Abdomen Exam: soft, No tenderness, No mass


Extremity Exam: normal inspection, normal range of motion


Back Exam: normal inspection, normal range of motion, No CVA tenderness, No 

vertebral tenderness


Pelvic Exam: deferred


Rectal Exam: deferred





OBJECTIVE DATA


Vital Signs: 


 Vital Signs - 24 hr











  Temp Pulse Resp BP Pulse Ox


 


 06/22/18 08:00  99.8 F  108 H  20  135/62  94 L


 


 06/22/18 06:50   105 H  20   97


 


 06/22/18 04:00  97.9 F  106 H  18  176/74  94 L


 


 06/22/18 00:51   107 H  24   94 L


 


 06/22/18 00:00  99.1 F  108 H  18  138/63  94 L


 


 06/21/18 20:29  98.6 F  96 H  20  139/73  95


 


 06/21/18 20:00  98.6 F  96 H  20  139/73  95


 


 06/21/18 19:43   98 H  24   95


 


 06/21/18 17:40  98.6 F  96 H  20  139/73  95


 


 06/21/18 16:57   95 H  18  153/75  95


 


 06/21/18 16:24      91 L


 


 06/21/18 15:55  102 F  101 H  18  105/87  96


 


 06/21/18 14:26  100.8 F  109 H  18  163/83  91 L








 Oxygen-Last 24 hours











O2 Percentage                  2 Liters = 28%


 


O2 Percentage                  2 Liters = 28%


 


O2 Percentage                  2 Liters = 28%


 


O2 Percentage                  2 Liters = 28%


 


O2 Percentage                  2 Liters = 28%


 


O2 Percentage                  2 Liters = 28%


 


O2 Percentage                  2 Liters = 28%


 


Oxygen Flowrate (L/min)-RT     2











 Pain Assessment - Last Documented











Pain Intensity                 7


 


Pain Scale Used                0-10 Pain Scale











Intake and Output: 


 Intake & Output











 06/19/18 06/20/18 06/21/18 06/22/18





 11:59 11:59 11:59 11:59


 


Intake Total    1955


 


Output Total    500


 


Balance    1455


 


Weight    100 kg











Lab Results: 


 Accuchecks











Accucheck Value:               92











 Lab Results-Last 24 Hours











  06/21/18 06/21/18 06/21/18 Range/Units





  14:45 14:45 15:29 


 


WBC  19.9 H    (4.0-10.5)  K/mm3


 


RBC  4.78    (4.1-5.4)  M/mm3


 


Hgb  14.3    (12.0-16.0)  gm/dl


 


Hct  40.8    (35-47)  %


 


MCV  85.4    ()  fl


 


MCH  29.9    (26-32)  pg


 


MCHC  35.0    (32-36)  g/dl


 


RDW  13.8    (11.5-14.0)  %


 


Plt Count  253    (150-450)  K/mm3


 


MPV  11.0 H    (6-9.5)  fl


 


Gran %  86.4 H    (36.0-66.0)  %


 


Eos # (Auto)  0.02    (0-0.5)  


 


Absolute Lymphs (auto)  1.16    (1.0-4.6)  


 


Absolute Monos (auto)  1.50 H    (0.0-1.3)  


 


Lymphocytes %  5.8 L    (24.0-44.0)  %


 


Monocytes %  7.6    (0.0-12.0)  %


 


Eosinophils %  0.1    (0.00-5.0)  %


 


Basophils %  0.1    (0.0-0.4)  %


 


Absolute Granulocytes  17.15 H    (1.4-6.9)  


 


Segmented Neutrophils  86 H    (36.0-66.0)  %


 


Band Neutrophils  1    (0.0-2.0)  %


 


Lymphocytes (Manual)  9 L    (24-44)  %


 


Monocytes (Manual)  4    (0.0-12.0)  %


 


Basophils #  0.02    (0-0.4)  


 


Platelet Estimate  NORMAL    (NORMAL)  


 


RBC Morphology  NORMAL    


 


Sodium   134 L   (137-145)  mmol/L


 


Potassium   3.0 L   (3.5-5.1)  mmol/L


 


Chloride   97 L   ()  mmol/L


 


Carbon Dioxide   27   (22-30)  mmol/L


 


Anion Gap   13.3   (5-15)  MEQ/L


 


BUN   12   (7-17)  mg/dL


 


Creatinine   0.59   (0.52-1.04)  mg/dL


 


Estimated GFR   > 60.0   ML/MIN


 


Glucose   108 H   ()  mg/dL


 


Calcium   8.2 L   (8.4-10.2)  mg/dL


 


Total Bilirubin   1.20   (0.2-1.3)  mg/dL


 


AST   67 H   (14-36)  U/L


 


ALT   50 H   (0-35)  U/L


 


Alkaline Phosphatase   114   ()  U/L


 


Serum Total Protein   5.9 L   (6.3-8.2)  g/dL


 


Albumin   3.1 L   (3.5-5.0)  g/dL


 


Amylase   < 30 L   ()  U/L


 


Ur Collection Type    VOID  


 


Urine Color    YELLOW  (YELLOW)  


 


Urine Appearance    CLEAR  (CLEAR)  


 


Urine pH    6.0  (5-6)  


 


Ur Specific Gravity    1.010  (1.005-1.025)  


 


Urine Protein    1+  (Negative)  


 


Urine Ketones    MODERATE  (NEGATIVE)  


 


Urine Blood    250  (0-5)  William/ul


 


Urine Nitrite    NEGATIVE  (NEGATIVE)  


 


Urine Bilirubin    SMALL  (NEGATIVE)  


 


Urine Urobilinogen    4  (0-1)  mg/dL


 


Ur Leukocyte Esterase    TRACE  (NEGATIVE)  


 


Urine Microscopic RBC    0-2  (0-2)  /HPF


 


Urine Microscopic WBC    5-10  (0-5)  /HPF


 


Ur Epithelial Cells    RARE  (FEW)  /HPF


 


Urine Bacteria    FEW  (NEGATIVE)  /HPF


 


Urine Culture Reflexed    YES  (NO)  


 


Urine Glucose    NEGATIVE  (NEGATIVE)  mg/dL


 


Stool Occult Blood     (Negative)  


 


Specimen Received    6/21/18 1529  














  06/21/18 Range/Units





  Unknown 


 


WBC   (4.0-10.5)  K/mm3


 


RBC   (4.1-5.4)  M/mm3


 


Hgb   (12.0-16.0)  gm/dl


 


Hct   (35-47)  %


 


MCV   ()  fl


 


MCH   (26-32)  pg


 


MCHC   (32-36)  g/dl


 


RDW   (11.5-14.0)  %


 


Plt Count   (150-450)  K/mm3


 


MPV   (6-9.5)  fl


 


Gran %   (36.0-66.0)  %


 


Eos # (Auto)   (0-0.5)  


 


Absolute Lymphs (auto)   (1.0-4.6)  


 


Absolute Monos (auto)   (0.0-1.3)  


 


Lymphocytes %   (24.0-44.0)  %


 


Monocytes %   (0.0-12.0)  %


 


Eosinophils %   (0.00-5.0)  %


 


Basophils %   (0.0-0.4)  %


 


Absolute Granulocytes   (1.4-6.9)  


 


Segmented Neutrophils   (36.0-66.0)  %


 


Band Neutrophils   (0.0-2.0)  %


 


Lymphocytes (Manual)   (24-44)  %


 


Monocytes (Manual)   (0.0-12.0)  %


 


Basophils #   (0-0.4)  


 


Platelet Estimate   (NORMAL)  


 


RBC Morphology   


 


Sodium   (137-145)  mmol/L


 


Potassium   (3.5-5.1)  mmol/L


 


Chloride   ()  mmol/L


 


Carbon Dioxide   (22-30)  mmol/L


 


Anion Gap   (5-15)  MEQ/L


 


BUN   (7-17)  mg/dL


 


Creatinine   (0.52-1.04)  mg/dL


 


Estimated GFR   ML/MIN


 


Glucose   ()  mg/dL


 


Calcium   (8.4-10.2)  mg/dL


 


Total Bilirubin   (0.2-1.3)  mg/dL


 


AST   (14-36)  U/L


 


ALT   (0-35)  U/L


 


Alkaline Phosphatase   ()  U/L


 


Serum Total Protein   (6.3-8.2)  g/dL


 


Albumin   (3.5-5.0)  g/dL


 


Amylase   ()  U/L


 


Ur Collection Type   


 


Urine Color   (YELLOW)  


 


Urine Appearance   (CLEAR)  


 


Urine pH   (5-6)  


 


Ur Specific Gravity   (1.005-1.025)  


 


Urine Protein   (Negative)  


 


Urine Ketones   (NEGATIVE)  


 


Urine Blood   (0-5)  William/ul


 


Urine Nitrite   (NEGATIVE)  


 


Urine Bilirubin   (NEGATIVE)  


 


Urine Urobilinogen   (0-1)  mg/dL


 


Ur Leukocyte Esterase   (NEGATIVE)  


 


Urine Microscopic RBC   (0-2)  /HPF


 


Urine Microscopic WBC   (0-5)  /HPF


 


Ur Epithelial Cells   (FEW)  /HPF


 


Urine Bacteria   (NEGATIVE)  /HPF


 


Urine Culture Reflexed   (NO)  


 


Urine Glucose   (NEGATIVE)  mg/dL


 


Stool Occult Blood  NEGATIVE  (Negative)  


 


Specimen Received   











Radiology Exams: 


 Radiology Procedures











 Category Date Time Status


 


 CHEST 1 VIEW (PORTABLE) Urgent Exams  06/22/18 10:11 Taken


 


 OBSTR/ACUTE ABDOMEN SERIES Stat Exams  06/21/18 14:40 Completed











Multi-Disciplinary Progress Notes: 


 Multi-Disciplinary Progress Notes





06/22/18 10:27 Case Management Note by Michaela Davidson


REFERRAL CALLED TO [facility].  FAXED INFORMATION [phone].





Initialized on 06/22/18 10:27 - END OF NOTE








06/22/18 09:25 (created 06/22/18 10:25) Case Management Note by Michaela Davidson


DISCHARGE PLAN REVIEWED WITH PT.  REPORTS THAT SHE IS PLANNING TO GO FOR A 

REHAB STAY ON DISCHARGE.  REPORTS THAT HER PREFERENCE WOULD BE TO GO TO 

[facility].  THIS FACILITY IS CLOSER TO HER HOME, 

FRIENDS, AND FAMILY. DENIES ADDNL NEEDS AT PRESENT.  WILL FOLLOW.





Initialized on 06/22/18 10:25 - END OF NOTE

















Assessment/Plan


(1) Pneumonia


Current Visit: Yes   Status: Acute   Onset Date: ~06/22/18   


Qualifiers: 


   Pneumonia type: due to unspecified organism   Laterality: unspecified 

laterality   Lung location: unspecified part of lung   Qualified Code(s): J18.9 

- Pneumonia, unspecified organism   


Code(s): J18.9 - PNEUMONIA, UNSPECIFIED ORGANISM   





(2) Hypokalemia


Current Visit: Yes   Status: Acute   Onset Date: ~06/22/18   Code(s): E87.6 - 

HYPOKALEMIA   





(3) Vomiting and diarrhea


Current Visit: Yes   Status: Resolved   Onset Date: ~06/22/18   Code(s): R11.10 

- VOMITING, UNSPECIFIED; R19.7 - DIARRHEA, UNSPECIFIED   





(4) Abdominal pain


Current Visit: Yes   Status: Resolved   Onset Date: ~06/22/18   


Qualifiers: 


   Abdominal location: generalized   Qualified Code(s): R10.84 - Generalized 

abdominal pain   


Code(s): R10.9 - UNSPECIFIED ABDOMINAL PAIN   





(5) Diabetes mellitus type 2


Current Visit: Yes   Status: Chronic   Code(s): E11.9 - TYPE 2 DIABETES 

MELLITUS WITHOUT COMPLICATIONS   





(6) Hypertension


Current Visit: Yes   Status: Chronic   


Qualifiers: 


   Hypertension type: essential hypertension   Qualified Code(s): I10 - 

Essential (primary) hypertension   


Code(s): I10 - ESSENTIAL (PRIMARY) HYPERTENSION

## 2018-06-23 LAB
ALBUMIN SERPL-MCNC: 2.5 G/DL (ref 3.5–5)
ALP SERPL-CCNC: 130 U/L (ref 38–126)
ALT SERPL-CCNC: 46 U/L (ref 0–35)
ANION GAP SERPL CALC-SCNC: 10.1 MEQ/L (ref 5–15)
AST SERPL QL: 67 U/L (ref 14–36)
BILIRUB BLD-MCNC: 0.8 MG/DL (ref 0.2–1.3)
BUN SERPL-MCNC: 9 MG/DL (ref 7–17)
CALCIUM SPEC-MCNC: 7.5 MG/DL (ref 8.4–10.2)
CHLORIDE SERPL-SCNC: 104 MMOL/L (ref 98–107)
CO2 SERPL-SCNC: 25 MMOL/L (ref 22–30)
CREAT SERPL-MCNC: 0.75 MG/DL (ref 0.52–1.04)
GLUCOSE SERPL-MCNC: 152 MG/DL (ref 74–106)
HCT VFR BLD AUTO: 38.9 % (ref 35–47)
HGB BLD-MCNC: 13.2 GM/DL (ref 12–16)
MCH RBC QN AUTO: 29.9 PG (ref 26–32)
MCHC RBC AUTO-ENTMCNC: 33.9 G/DL (ref 32–36)
PLATELET # BLD AUTO: 275 K/MM3 (ref 150–450)
POTASSIUM SERPLBLD-SCNC: 4 MMOL/L (ref 3.5–5.1)
PROT SERPL-MCNC: 5.2 G/DL (ref 6.3–8.2)
RBC # BLD AUTO: 4.41 M/MM3 (ref 4.1–5.4)
SODIUM SERPL-SCNC: 136 MMOL/L (ref 137–145)
WBC # BLD AUTO: 15.7 K/MM3 (ref 4–10.5)

## 2018-06-23 RX ADMIN — INSULIN GLARGINE SCH: 100 INJECTION, SOLUTION SUBCUTANEOUS at 22:00

## 2018-06-23 RX ADMIN — IPRATROPIUM BROMIDE AND ALBUTEROL SULFATE SCH ML: .5; 3 SOLUTION RESPIRATORY (INHALATION) at 07:31

## 2018-06-23 RX ADMIN — PROMETHAZINE HYDROCHLORIDE PRN MG: 25 INJECTION INTRAMUSCULAR; INTRAVENOUS at 07:51

## 2018-06-23 RX ADMIN — IPRATROPIUM BROMIDE AND ALBUTEROL SULFATE SCH ML: .5; 3 SOLUTION RESPIRATORY (INHALATION) at 13:21

## 2018-06-23 RX ADMIN — LEVOTHYROXINE SODIUM SCH MCG: 25 TABLET ORAL at 10:54

## 2018-06-23 RX ADMIN — HYDROCHLOROTHIAZIDE SCH MG: 25 TABLET ORAL at 22:03

## 2018-06-23 RX ADMIN — POTASSIUM CHLORIDE AND SODIUM CHLORIDE SCH MLS/HR: 900; 150 INJECTION, SOLUTION INTRAVENOUS at 07:51

## 2018-06-23 RX ADMIN — POTASSIUM CHLORIDE SCH MEQ: 10 TABLET, EXTENDED RELEASE ORAL at 10:53

## 2018-06-23 RX ADMIN — LORATADINE SCH MG: 10 TABLET ORAL at 10:54

## 2018-06-23 RX ADMIN — OXYCODONE HYDROCHLORIDE AND ACETAMINOPHEN PRN TAB: 5; 325 TABLET ORAL at 05:26

## 2018-06-23 RX ADMIN — FAMOTIDINE SCH MG: 10 INJECTION INTRAVENOUS at 16:38

## 2018-06-23 RX ADMIN — ONDANSETRON PRN MG: 2 INJECTION, SOLUTION INTRAMUSCULAR; INTRAVENOUS at 05:26

## 2018-06-23 RX ADMIN — FLUTICASONE PROPIONATE SCH GM: 50 SPRAY, METERED NASAL at 10:54

## 2018-06-23 RX ADMIN — OXYCODONE HYDROCHLORIDE AND ACETAMINOPHEN PRN TAB: 5; 325 TABLET ORAL at 19:40

## 2018-06-23 RX ADMIN — FLUTICASONE PROPIONATE AND SALMETEROL XINAFOATE SCH PUFF: 115; 21 AEROSOL, METERED RESPIRATORY (INHALATION) at 18:47

## 2018-06-23 RX ADMIN — AZITHROMYCIN DIHYDRATE SCH MLS/HR: 500 INJECTION, POWDER, LYOPHILIZED, FOR SOLUTION INTRAVENOUS at 09:37

## 2018-06-23 RX ADMIN — AMLODIPINE BESYLATE SCH MG: 5 TABLET ORAL at 10:53

## 2018-06-23 RX ADMIN — PANTOPRAZOLE SODIUM SCH MG: 40 TABLET, DELAYED RELEASE ORAL at 10:54

## 2018-06-23 RX ADMIN — CARVEDILOL SCH MG: 12.5 TABLET, FILM COATED ORAL at 22:03

## 2018-06-23 RX ADMIN — FLUOXETINE SCH MG: 20 CAPSULE ORAL at 10:53

## 2018-06-23 RX ADMIN — FLUTICASONE PROPIONATE AND SALMETEROL XINAFOATE SCH PUFF: 115; 21 AEROSOL, METERED RESPIRATORY (INHALATION) at 07:31

## 2018-06-23 RX ADMIN — IPRATROPIUM BROMIDE AND ALBUTEROL SULFATE SCH: .5; 3 SOLUTION RESPIRATORY (INHALATION) at 04:54

## 2018-06-23 RX ADMIN — CARVEDILOL SCH MG: 12.5 TABLET, FILM COATED ORAL at 10:53

## 2018-06-23 RX ADMIN — HYDROCHLOROTHIAZIDE SCH MG: 25 TABLET ORAL at 10:53

## 2018-06-23 RX ADMIN — POLYETHYLENE GLYCOL 3350 SCH: 17 POWDER, FOR SOLUTION ORAL at 10:55

## 2018-06-23 RX ADMIN — POTASSIUM CHLORIDE SCH MEQ: 10 TABLET, EXTENDED RELEASE ORAL at 22:03

## 2018-06-23 RX ADMIN — ONDANSETRON PRN MG: 2 INJECTION, SOLUTION INTRAMUSCULAR; INTRAVENOUS at 11:00

## 2018-06-23 RX ADMIN — IPRATROPIUM BROMIDE AND ALBUTEROL SULFATE SCH ML: .5; 3 SOLUTION RESPIRATORY (INHALATION) at 18:46

## 2018-06-23 NOTE — PCM.NOTE
Date and Time: 06/23/18  0859





Subjective Assessment: 





c/o abdominal pain, shortness of breath





- Review of Systems


Constitutional: No Fever, No Chills


Eyes: No Symptoms


Ears, Nose, & Throat: No Symptoms


Respiratory: Short Of Breath, No Cough


Cardiac: No Chest Pain, No Edema, No Syncope


Abdominal/Gastrointestinal: Abdominal Pain, No Nausea, No Vomiting, No Diarrhea


Genitourinary Symptoms: No Dysuria


Musculoskeletal: No Back Pain, No Neck Pain


Skin: No Rash


Neurological: No Dizziness, No Focal Weakness, No Sensory Changes


Psychological: No Symptoms


Endocrine: No Symptoms


Hematologic/Lymphatic: No Symptoms


Immunological/Allergic: No Symptoms





Objective Exam


General Appearance: no apparent distress, alert


Neurologic Exam: alert, oriented x 3, cooperative, normal mood/affect, nml 

cerebellar function, sensation nml, No motor deficits


Skin Exam: normal color, warm, dry


Eye Exam: PERRL, EOMI, eyes nml inspection


Ears, Nose, Throat Exam: normal ENT inspection, pharynx normal, moist mucous 

membranes


Neck Exam: normal inspection, non-tender, supple, full range of motion


Respiratory Exam: normal breath sounds, lungs clear, No respiratory distress


Cardiovascular Exam: regular rate/rhythm, normal heart sounds


Gastrointestinal/Abdomen Exam: soft, tenderness, No mass


Extremity Exam: normal inspection, normal range of motion


Back Exam: normal inspection, normal range of motion, No CVA tenderness, No 

vertebral tenderness


Pelvic Exam: deferred


Rectal Exam: deferred





OBJECTIVE DATA


Vital Signs: 


 Vital Signs - 24 hr











  Temp Pulse Resp BP Pulse Ox


 


 06/23/18 07:31   88  22   84 L


 


 06/23/18 07:09  98.1 F  89  20  145/67  95


 


 06/23/18 04:15  98.2 F  91 H  24  121/58  91 L


 


 06/22/18 23:32  99.8 F  88  18  130/61  93 L


 


 06/22/18 20:10  98.1 F  92 H  20  140/65  96


 


 06/22/18 19:00   90  22   93 L


 


 06/22/18 16:00  98.6 F  88  21  128/58  94 L


 


 06/22/18 12:30   92 H  22   92 L


 


 06/22/18 12:00  97.5 F  93 H  20  140/66  91 L








 Oxygen-Last 24 hours











O2 Percentage                  2 Liters = 28%


 


O2 Percentage                  2 Liters = 28%


 


O2 Percentage                  2 Liters = 28%


 


O2 Percentage                  2 Liters = 28%


 


O2 Percentage                  2 Liters = 28%


 


O2 Percentage                  2 Liters = 28%











 Pain Assessment - Last Documented











Pain Intensity                 6


 


Pain Scale Used                0-10 Pain Scale











Intake and Output: 


 Intake & Output











 06/20/18 06/21/18 06/22/18 06/23/18





 11:59 11:59 11:59 11:59


 


Intake Total   1985 3826


 


Output Total   500 500


 


Balance   1485 3326


 


Weight   100 kg 











Lab Results: 


 Accuchecks











Date                           06/23/18


 


Date                           06/22/18


 


Date                           06/22/18


 


Time                           08:23


 


Time                           16:30


 


Time                           11:30


 


Accucheck Value:               105


 


Accucheck Value:               135


 


Accucheck Value:               118


 


Accucheck Value:               115














Radiology Exams: 


 Radiology Procedures











 Category Date Time Status


 


 CHEST 1 VIEW (PORTABLE) Urgent Exams  06/22/18 10:11 Completed


 


 OBSTR/ACUTE ABDOMEN SERIES Stat Exams  06/21/18 14:40 Completed











Multi-Disciplinary Progress Notes: 


 Multi-Disciplinary Progress Notes





06/22/18 13:00 (created 06/22/18 14:38) Case Management Note by Zenaida Weeks


PASRR PAPERS COMPLETED AT THIS TIME, NO LEVEL11 REQUIRED. PASRR PAPERS FAXED TO 

[facility] AND PLACED ON CHART.





Initialized on 06/22/18 14:38 - END OF NOTE








06/22/18 10:27 Case Management Note by Michaela Davidson


REFERRAL CALLED TO [facility].  FAXED INFORMATION [phone].





Initialized on 06/22/18 10:27 - END OF NOTE








06/22/18 09:25 (created 06/22/18 10:25) Case Management Note by Michaela Davidson


DISCHARGE PLAN REVIEWED WITH PT.  REPORTS THAT SHE IS PLANNING TO GO FOR A 

REHAB STAY ON DISCHARGE.  REPORTS THAT HER PREFERENCE WOULD BE TO GO TO 

[facility].  THIS FACILITY IS CLOSER TO HER HOME, 

FRIENDS, AND FAMILY. DENIES ADDNL NEEDS AT PRESENT.  WILL FOLLOW.





Initialized on 06/22/18 10:25 - END OF NOTE

















Assessment/Plan


(1) Abdominal pain


Current Visit: Yes   Status: Resolved   Onset Date: ~06/22/18   


Qualifiers: 


   Abdominal location: generalized   Qualified Code(s): R10.84 - Generalized 

abdominal pain   


Code(s): R10.9 - UNSPECIFIED ABDOMINAL PAIN   





(2) Pneumonia


Current Visit: Yes   Status: Acute   Onset Date: ~06/22/18   


Qualifiers: 


   Pneumonia type: due to unspecified organism   Laterality: unspecified 

laterality   Lung location: unspecified part of lung   Qualified Code(s): J18.9 

- Pneumonia, unspecified organism   


Code(s): J18.9 - PNEUMONIA, UNSPECIFIED ORGANISM   





(3) Hypokalemia


Current Visit: Yes   Status: Resolved   Onset Date: ~06/22/18   Code(s): E87.6 

- HYPOKALEMIA   





(4) Vomiting and diarrhea


Current Visit: Yes   Status: Resolved   Onset Date: ~06/22/18   Code(s): R11.10 

- VOMITING, UNSPECIFIED; R19.7 - DIARRHEA, UNSPECIFIED   





(5) Diabetes mellitus type 2


Current Visit: Yes   Status: Chronic   Code(s): E11.9 - TYPE 2 DIABETES 

MELLITUS WITHOUT COMPLICATIONS   





(6) Hypertension


Current Visit: Yes   Status: Chronic   


Qualifiers: 


   Hypertension type: essential hypertension   Qualified Code(s): I10 - 

Essential (primary) hypertension   


Code(s): I10 - ESSENTIAL (PRIMARY) HYPERTENSION

## 2018-06-24 VITALS — DIASTOLIC BLOOD PRESSURE: 72 MMHG | SYSTOLIC BLOOD PRESSURE: 149 MMHG

## 2018-06-24 VITALS — OXYGEN SATURATION: 90 %

## 2018-06-24 VITALS — HEART RATE: 91 BPM

## 2018-06-24 RX ADMIN — IPRATROPIUM BROMIDE AND ALBUTEROL SULFATE SCH ML: .5; 3 SOLUTION RESPIRATORY (INHALATION) at 07:15

## 2018-06-24 RX ADMIN — OXYCODONE HYDROCHLORIDE AND ACETAMINOPHEN PRN TAB: 5; 325 TABLET ORAL at 03:50

## 2018-06-24 RX ADMIN — FLUOXETINE SCH MG: 20 CAPSULE ORAL at 09:09

## 2018-06-24 RX ADMIN — POLYETHYLENE GLYCOL 3350 SCH GM: 17 POWDER, FOR SOLUTION ORAL at 09:08

## 2018-06-24 RX ADMIN — FLUTICASONE PROPIONATE AND SALMETEROL XINAFOATE SCH PUFF: 115; 21 AEROSOL, METERED RESPIRATORY (INHALATION) at 07:17

## 2018-06-24 RX ADMIN — FLUTICASONE PROPIONATE SCH GM: 50 SPRAY, METERED NASAL at 09:10

## 2018-06-24 RX ADMIN — AZITHROMYCIN DIHYDRATE SCH MLS/HR: 500 INJECTION, POWDER, LYOPHILIZED, FOR SOLUTION INTRAVENOUS at 09:08

## 2018-06-24 RX ADMIN — AMLODIPINE BESYLATE SCH MG: 5 TABLET ORAL at 09:09

## 2018-06-24 RX ADMIN — LEVOTHYROXINE SODIUM SCH MCG: 25 TABLET ORAL at 09:09

## 2018-06-24 RX ADMIN — FAMOTIDINE SCH MG: 10 INJECTION INTRAVENOUS at 09:09

## 2018-06-24 RX ADMIN — HYDROCHLOROTHIAZIDE SCH MG: 25 TABLET ORAL at 09:09

## 2018-06-24 RX ADMIN — IPRATROPIUM BROMIDE AND ALBUTEROL SULFATE SCH ML: .5; 3 SOLUTION RESPIRATORY (INHALATION) at 12:42

## 2018-06-24 RX ADMIN — POTASSIUM CHLORIDE SCH MEQ: 10 TABLET, EXTENDED RELEASE ORAL at 09:10

## 2018-06-24 RX ADMIN — IPRATROPIUM BROMIDE AND ALBUTEROL SULFATE SCH ML: .5; 3 SOLUTION RESPIRATORY (INHALATION) at 04:01

## 2018-06-24 RX ADMIN — POTASSIUM CHLORIDE AND SODIUM CHLORIDE SCH MLS/HR: 900; 150 INJECTION, SOLUTION INTRAVENOUS at 09:08

## 2018-06-24 RX ADMIN — CARVEDILOL SCH MG: 12.5 TABLET, FILM COATED ORAL at 09:10

## 2018-06-24 RX ADMIN — LORATADINE SCH MG: 10 TABLET ORAL at 09:09

## 2018-06-24 NOTE — PCM.NOTE
Date and Time: 06/24/18 0923





Subjective Assessment: 





doing ok





- Review of Systems


Constitutional: No Fever, No Chills


Eyes: No Symptoms


Ears, Nose, & Throat: No Symptoms


Respiratory: Cough, Orthopnea, Short Of Breath


Cardiac: No Chest Pain, No Edema, No Syncope


Abdominal/Gastrointestinal: No Abdominal Pain, No Nausea, No Vomiting, No 

Diarrhea


Genitourinary Symptoms: No Dysuria


Musculoskeletal: No Back Pain, No Neck Pain


Skin: No Rash


Neurological: No Dizziness, No Focal Weakness, No Sensory Changes


Psychological: No Symptoms


Endocrine: No Symptoms


Hematologic/Lymphatic: No Symptoms


Immunological/Allergic: No Symptoms





Objective Exam


General Appearance: no apparent distress, alert


Neurologic Exam: alert, oriented x 3, cooperative, normal mood/affect, nml 

cerebellar function, sensation nml, No motor deficits


Skin Exam: normal color, warm, dry


Eye Exam: PERRL, EOMI, eyes nml inspection


Ears, Nose, Throat Exam: normal ENT inspection, pharynx normal, moist mucous 

membranes


Neck Exam: normal inspection, non-tender, supple, full range of motion


Respiratory Exam: normal breath sounds, diminished breath sounds, crackles/rales

, rhonchi, wheezing, No respiratory distress


Cardiovascular Exam: regular rate/rhythm, normal heart sounds


Gastrointestinal/Abdomen Exam: soft, No tenderness, No mass


Extremity Exam: normal inspection, normal range of motion


Back Exam: normal inspection, normal range of motion, No CVA tenderness, No 

vertebral tenderness


Pelvic Exam: deferred


Rectal Exam: deferred





OBJECTIVE DATA


Vital Signs: 


 Vital Signs - 24 hr











  Temp Pulse Resp BP Pulse Ox


 


 06/24/18 07:15   86  20   95


 


 06/24/18 07:04  98.7 F  87  20  137/65  95


 


 06/24/18 04:17  99.4 F  91 H  18  145/68  93 L


 


 06/24/18 04:00    20  


 


 06/24/18 01:00   90  22   94 L


 


 06/24/18 00:00    20  


 


 06/23/18 23:47  98.5 F  92 H  24  131/61  93 L


 


 06/23/18 20:11  98.9 F  90  22  150/70  94 L


 


 06/23/18 19:54    22  


 


 06/23/18 18:51   92 H  24   91 L


 


 06/23/18 16:00  100.4 F  93 H  17  144/69  90 L


 


 06/23/18 15:01    20  


 


 06/23/18 13:21   91 H  22   92 L


 


 06/23/18 12:49  98.4 F    


 


 06/23/18 12:00    18  


 


 06/23/18 11:04  98 F  90  20  148/70  95








 Oxygen-Last 24 hours











O2 Percentage                  2 Liters = 28%


 


O2 Percentage                  2 Liters = 28%


 


O2 Percentage                  2 Liters = 28%


 


O2 Percentage                  2 Liters = 28%


 


O2 Percentage                  2 Liters = 28%


 


O2 Percentage                  2 Liters = 28%


 


O2 Percentage                  2 Liters = 28%











 Pain Assessment - Last Documented











Pain Intensity                 0


 


Pain Scale Used                0-10 Pain Scale











Intake and Output: 


 Intake & Output











 06/21/18 06/22/18 06/23/18 06/24/18





 11:59 11:59 11:59 11:59


 


Intake Total  1985 3826 919


 


Output Total  500 900 900


 


Balance  1025 2926 19


 


Weight  100 kg  











Lab Results: 


 Accuchecks











Date                           06/23/18


 


Date                           06/23/18


 


Date                           06/23/18


 


Time                           22:33


 


Time                           16:30


 


Time                           11:30


 


Accucheck Value:               93


 


Accucheck Value:               111


 


Accucheck Value:               122


 


Accucheck Value:               131











 Lab Results-Last 24 Hours











  06/23/18 06/23/18 Range/Units





  10:34 10:34 


 


WBC  15.7 H   (4.0-10.5)  K/mm3


 


RBC  4.41   (4.1-5.4)  M/mm3


 


Hgb  13.2   (12.0-16.0)  gm/dl


 


Hct  38.9   (35-47)  %


 


MCV  88.2   ()  fl


 


MCH  29.9   (26-32)  pg


 


MCHC  33.9   (32-36)  g/dl


 


RDW  14.4 H   (11.5-14.0)  %


 


Plt Count  275   (150-450)  K/mm3


 


MPV  10.7 H   (6-9.5)  fl


 


Sodium   136 L  (137-145)  mmol/L


 


Potassium   4.0  (3.5-5.1)  mmol/L


 


Chloride   104  ()  mmol/L


 


Carbon Dioxide   25  (22-30)  mmol/L


 


Anion Gap   10.1  (5-15)  MEQ/L


 


BUN   9  (7-17)  mg/dL


 


Creatinine   0.75  (0.52-1.04)  mg/dL


 


Estimated GFR   > 60.0  ML/MIN


 


Glucose   152 H  ()  mg/dL


 


Calcium   7.5 L  (8.4-10.2)  mg/dL


 


Total Bilirubin   0.80  (0.2-1.3)  mg/dL


 


AST   67 H  (14-36)  U/L


 


ALT   46 H  (0-35)  U/L


 


Alkaline Phosphatase   130 H  ()  U/L


 


Serum Total Protein   5.2 L  (6.3-8.2)  g/dL


 


Albumin   2.5 L  (3.5-5.0)  g/dL











Radiology Exams: 


 Radiology Procedures











 Category Date Time Status


 


 CHEST 1 VIEW (PORTABLE) Urgent Exams  06/22/18 10:11 Completed














Assessment/Plan


(1) Abdominal pain


Current Visit: Yes   Status: Resolved   Onset Date: ~06/22/18   


Qualifiers: 


   Abdominal location: generalized   Qualified Code(s): R10.84 - Generalized 

abdominal pain   


Code(s): R10.9 - UNSPECIFIED ABDOMINAL PAIN   





(2) Pneumonia


Current Visit: Yes   Status: Acute   Onset Date: ~06/22/18   


Qualifiers: 


   Pneumonia type: due to unspecified organism   Laterality: unspecified 

laterality   Lung location: unspecified part of lung   Qualified Code(s): J18.9 

- Pneumonia, unspecified organism   


Code(s): J18.9 - PNEUMONIA, UNSPECIFIED ORGANISM   





(3) Hypokalemia


Current Visit: Yes   Status: Resolved   Onset Date: ~06/22/18   Code(s): E87.6 

- HYPOKALEMIA   





(4) Vomiting and diarrhea


Current Visit: Yes   Status: Resolved   Onset Date: ~06/22/18   Code(s): R11.10 

- VOMITING, UNSPECIFIED; R19.7 - DIARRHEA, UNSPECIFIED   





(5) Diabetes mellitus type 2


Current Visit: Yes   Status: Chronic   Code(s): E11.9 - TYPE 2 DIABETES 

MELLITUS WITHOUT COMPLICATIONS   





(6) Hypertension


Current Visit: Yes   Status: Chronic   


Qualifiers: 


   Hypertension type: essential hypertension   Qualified Code(s): I10 - 

Essential (primary) hypertension   


Code(s): I10 - ESSENTIAL (PRIMARY) HYPERTENSION

## 2018-06-24 NOTE — PCM.DS
Discharge Summary


Date of Admission: 


06/21/18 18:00





Admitting Physician: 


NANCY BACA





Primary Care Provider: 


NANCY BACA








Allergies


Allergies





doxycycline Allergy (Intermediate, Verified 06/21/18 14:33)


 Swelling


Iodinated Contrast- Oral and IV Dye Allergy (Intermediate, Verified 06/21/18 14:

33)


 Hives


 hives, rash, diff breathing 


belladonna alkaloids [From B & O 16-A Supprette] Allergy (Mild, Verified 06/21/ 18 14:33)


 CHILD ALLERGY


cephalexin monohydrate [From Keflex] Allergy (Mild, Verified 06/21/18 14:33)


 Hives


furosemide [From Lasix] Allergy (Mild, Verified 06/21/18 14:33)


 HEART ARTHYM


prochlorperazine edisylate [From Compazine] Allergy (Mild, Verified 06/21/18 14:

33)


 HALLUCINATIONS


prochlorperazine maleate [From Compazine] Allergy (Mild, Verified 06/21/18 14:33

)


 HALLUCINATIONS


Sulfa (Sulfonamide Antibiotics) [Sulfa(Sulfonamide Antibiotics)] Allergy (Mild, 

Verified 06/21/18 14:33)


 Hives


codeine [Codeine] Adverse Reaction (Mild, Verified 06/21/18 14:33)


 Headache


hydrocodone bitartrate [From Vicodin] Adverse Reaction (Mild, Verified 06/21/18 

14:33)


 Headache


ketorolac tromethamine [From Toradol] Adverse Reaction (Mild, Verified 06/21/18 

14:33)


 Vomiting


metformin HCl [From Glucophage] Adverse Reaction (Mild, Verified 06/21/18 14:33)


 Vomiting


naproxen [From Naprosyn] Adverse Reaction (Mild, Verified 06/21/18 14:33)


 Headache


opium *RETIRED-04/24/13 [From B & O 16-A Supprette] Adverse Reaction (Mild, 

Verified 06/21/18 14:33)


 DON'T LIKE THEM


phenytoin sodium [From Dilantin] Adverse Reaction (Mild, Verified 06/21/18 14:33

)


 FEEL DRUNK


phenytoin sodium extended [From Dilantin] Adverse Reaction (Mild, Verified 06/21 /18 14:33)


 FEEL DRUNK


Lactobacillus acidophilus [From Acidophilus] Adverse Reaction (Verified 06/21/ 18 14:33)


 


diuretics Allergy (Intermediate, Uncoded 06/21/18 14:33)


 HEART ARRHYTHMIAS


 pt states she is allergic to all diuretics. pt has cardiac arrhythmias 


influenza vaccine Adverse Reaction (Uncoded 06/21/18 14:33)


 











Hospital Summary





- Hospital Course


Hospital Course: 








 Chief Complaint





Diagnosis                        Pneumonia, hypokalemia, abd pain, vomiting,


                                 diarrhea





 Allergies











Allergy/AdvReac Type Severity Reaction Status Date / Time


 


doxycycline Allergy Intermediate Swelling Verified 06/21/18 14:33


 


Iodinated Contrast- Oral and Allergy Intermediate Hives Verified 06/21/18 14:33





IV Dye     


 


belladonna alkaloids Allergy Mild CHILD Verified 06/21/18 14:33





[From B & O 16-A Supprette]   ALLERGY  


 


cephalexin monohydrate Allergy Mild Hives Verified 06/21/18 14:33





[From Keflex]     


 


furosemide [From Lasix] Allergy Mild HEART Verified 06/21/18 14:33





   ARTHYM  


 


prochlorperazine edisylate Allergy Mild HALLUCINATI Verified 06/21/18 14:33





[From Compazine]   ONS  


 


prochlorperazine maleate Allergy Mild HALLUCINATI Verified 06/21/18 14:33





[From Compazine]   ONS  


 


Sulfa (Sulfonamide Allergy Mild Hives Verified 06/21/18 14:33





Antibiotics)     





[Sulfa(Sulfonamide     





Antibiotics)]     


 


codeine [Codeine] AdvReac Mild Headache Verified 06/21/18 14:33


 


hydrocodone bitartrate AdvReac Mild Headache Verified 06/21/18 14:33





[From Vicodin]     


 


ketorolac tromethamine AdvReac Mild Vomiting Verified 06/21/18 14:33





[From Toradol]     


 


metformin HCl AdvReac Mild Vomiting Verified 06/21/18 14:33





[From Glucophage]     


 


naproxen [From Naprosyn] AdvReac Mild Headache Verified 06/21/18 14:33


 


opium *RETIRED-04/24/13 AdvReac Mild DON'T LIKE Verified 06/21/18 14:33





[From B & O 16-A Supprette]   THEM  


 


phenytoin sodium AdvReac Mild FEEL DRUNK Verified 06/21/18 14:33





[From Dilantin]     


 


phenytoin sodium extended AdvReac Mild FEEL DRUNK Verified 06/21/18 14:33





[From Dilantin]     


 


Lactobacillus acidophilus AdvReac   Verified 06/21/18 14:33





[From Acidophilus]     


 


diuretics Allergy Intermediate HEART Uncoded 06/21/18 14:33





   ARRHYTHMIAS  


 


influenza vaccine AdvReac   Uncoded 06/21/18 14:33








 Vital Signs (Last 24 hours)











  Temp Pulse Resp BP Pulse Ox


 


 06/24/18 07:15   86  20   95


 


 06/24/18 07:04  98.7 F  87  20  137/65  95


 


 06/24/18 04:17  99.4 F  91 H  18  145/68  93 L


 


 06/24/18 04:00    20  


 


 06/24/18 01:00   90  22   94 L


 


 06/24/18 00:00    20  


 


 06/23/18 23:47  98.5 F  92 H  24  131/61  93 L


 


 06/23/18 20:11  98.9 F  90  22  150/70  94 L


 


 06/23/18 19:54    22  


 


 06/23/18 18:51   92 H  24   91 L


 


 06/23/18 16:00  100.4 F  93 H  17  144/69  90 L


 


 06/23/18 15:01    20  


 


 06/23/18 13:21   91 H  22   92 L


 


 06/23/18 12:49  98.4 F    


 


 06/23/18 12:00    18  


 


 06/23/18 11:04  98 F  90  20  148/70  95








 Home Medications











 Medication  Instructions  Recorded  Confirmed  Last Taken  Type


 


Clindamycin HCl [Cleocin HCl] 150 mg PO QID 06/21/18 06/21/18 06/19/18 History


 


Morphine Sulfate 5 mg PO Q2H/PRN PRN 06/21/18 06/21/18 06/16/18 History


 


Oxycodone HCl/Acetaminophen 1 each PO Q4HPRN PRN 06/21/18 06/21/18 06/16/18 

History





[Oxycodone-Acetaminophen 5-325]     








 Current Medications











Generic Name Dose Route Start Last Admin





  Trade Name Freq  PRN Reason Stop Dose Admin


 


Albuterol/Ipratropium  3 ml  06/21/18 19:00  06/24/18 07:15





  Duoneb 0.5-3 Mg/3 Ml Neb**  IH  07/21/18 18:59  3 ml





  Q6HRT ELEUTERIO   Administration





     





     





     





     


 


Amlodipine Besylate  2.5 mg  06/22/18 10:00  06/24/18 09:09





  Norvasc 5 Mg***  PO  07/22/18 09:59  2.5 mg





  DAILY ELEUTERIO   Administration





     





     





     





     


 


Carvedilol  12.5 mg  06/21/18 22:30  06/24/18 09:10





  Coreg 12.5 Mg***  PO  07/21/18 22:29  12.5 mg





  BID ELEUTERIO   Administration





     





     





     





     


 


Famotidine  20 mg  06/23/18 19:38  06/24/18 09:09





  Pepcid 20 Mg Vial***  IV  07/23/18 15:59  20 mg





  Q12HT ELEUTERIO   Administration





     





     





     





     


 


Fluoxetine HCl  20 mg  06/22/18 10:00  06/24/18 09:09





  Prozac 20 Mg***  PO  07/22/18 09:59  20 mg





  DAILY ELEUTERIO   Administration





     





     





     





     


 


Fluticasone Propionate  0 gm  06/22/18 10:00  06/24/18 09:10





  Flonase Nasal***  NS  07/22/18 09:59  16 gm





  DAILY ELEUTERIO   Administration





     





     





     





     


 


Potassium Chloride/Sodium Chloride  1,000 mls @ 20 mls/hr  06/21/18 15:45  06/24 /18 09:08





  Sodium Chloride 0.9% W/ 20 Meq Kcl/Liter  IV  07/21/18 15:44  100 mls/hr





  .Q24H ELEUTERIO   Administration





     





     





     





     


 


Azithromycin  500 mg in 250 mls @ 250 mls/hr  06/22/18 10:00  06/24/18 09:08





  Zithromax 500 Mg/ 250 Ml Nacl Premix  IV  07/22/18 09:59  250 mls/hr





  Q24H10 ELEUTERIO   Administration





     





     





     





     


 


Insulin Glargine  35 unit  06/21/18 22:30  06/23/18 22:00





  Lantus Insulin**  SQ  07/21/18 22:29  Not Given





  HS ELEUTERIO   





     





     





     





     


 


Levothyroxine Sodium  25 mcg  06/22/18 10:00  06/24/18 09:09





  Synthroid 25 Mcg***  PO  07/22/18 09:59  25 mcg





  DAILY ELEUTERIO   Administration





     





     





     





     


 


Lisinopril  10 mg  06/21/18 22:30  06/24/18 09:09





  Zestril 10 Mg***  PO  07/21/18 22:29  10 mg





  BID ELEUTERIO   Administration





     





     





     





     


 


Loratadine  10 mg  06/22/18 10:00  06/24/18 09:09





  Claritin 10 Mg***  PO  07/22/18 09:59  10 mg





  DAILY ELEUTERIO   Administration





     





     





     





     


 


Nitroglycerin  0.4 mg  06/21/18 22:23  





  Nitrostat 0.4 Mg Tablet***  SL  07/21/18 22:22  





  Q5MIN PRN MR X 3 PRN   





  CHEST PAIN   





     





     





     


 


Ondansetron HCl  4 mg  06/21/18 22:11  06/23/18 11:00





  Zofran 4 Mg/2 Ml Vial**  IV  07/21/18 22:10  4 mg





  Q6H PRN PRN   Administration





  NAUSEA/VOMITING   





     





     





     


 


Ondansetron HCl  4 mg  06/21/18 22:23  





  Zofran Odt 4 Mg***  PO  07/21/18 22:22  





  Q6H PRN PRN   





  NAUSEA/VOMITING   





     





     





     


 


Oxycodone/Acetaminophen  1 tab  06/21/18 22:02  06/24/18 03:50





  Percocet Tablet 5/325mg***  PO  06/26/18 22:01  1 tab





  Q4H PRN PRN   Administration





  PAIN   





     





     





     


 


Pantoprazole Sodium  40 mg  06/24/18 10:00  06/24/18 09:09





  Protonix 40 Mg Iv***  IV  07/24/18 09:59  40 mg





  Q24H ELEUTERIO   Administration





     





     





     





     


 


Polyethylene Glycol  17 gm  06/22/18 16:00  06/24/18 09:08





  Miralax Powder 17gm Packet***  PO  07/22/18 15:59  17 gm





  DAILY ELEUTERIO   Administration





     





     





     





     


 


Potassium Chloride  20 meq  06/21/18 22:30  06/24/18 09:10





  Klor Con 10 Meq***  PO  07/21/18 22:29  20 meq





  BID ELEUTERIO   Administration





     





     





     





     


 


Promethazine HCl  12.5 mg  06/21/18 23:53  06/23/18 07:51





  Phenergan 25 Mg Inj***  IV  07/21/18 23:52  12.5 mg





  Q6H PRN PRN   Administration





  NAUSEA/VOMITING   





     





     





     


 


Fluticasone/Salmeterol  2 puff  06/21/18 19:00  06/24/18 07:17





  Advair Hfa 115/21 Common Canister*  IH  07/21/18 18:59  2 puff





  BIDRT ELEUTERIO   Administration





     





     





     





     














Discontinued Medications














Generic Name Dose Route Start Last Admin





  Trade Name Freq  PRN Reason Stop Dose Admin


 


Acetaminophen  650 mg  06/21/18 15:02  06/21/18 15:10





  Tylenol 325 Mg***  PO  06/21/18 15:03  650 mg





  STAT STA   Administration





     





     





     





     


 


Acetaminophen  Confirm  06/21/18 15:03  





  Tylenol 325 Mg***  Administered  06/21/18 15:04  





  Dose   





  650 mg   





  .ROUTE   





  .STK-MED ONE   





     





     





     





     


 


Clindamycin HCl  150 mg  06/21/18 23:00  06/22/18 08:30





  Cleocin 150 Mg Capsule***  PO  07/21/18 22:59  150 mg





  QID ELEUTERIO   Administration





     





     





     





     


 


Famotidine  20 mg  06/23/18 22:00  





  Pepcid 20 Mg Vial***  IV  07/23/18 21:59  





  Q12HT [facility]   





     





     





     





     


 


Famotidine  Confirm  06/23/18 18:02  





  Pepcid 20 Mg Vial***  Administered  06/23/18 18:03  





  Dose   





  20 mg   





  IV   





  .STK-MED ONE   





     





     





     





     


 


Sodium Chloride  1,000 mls @ 250 mls/hr  06/21/18 14:40  06/21/18 14:52





  Sodium Chloride 0.9% 1000 Ml  IV  06/21/18 18:39  250 mls/hr





  .Q4H STA   Administration





     





     





     





     


 


Sodium Chloride  Confirm  06/21/18 14:50  





  Sodium Chloride 0.9% 1000 Ml  Administered  06/21/18 14:51  





  Dose   





  1,000 mls @ ud   





  .ROUTE   





  .STK-MED ONE   





     





     





     





     


 


Potassium Chloride/Sodium Chloride  Confirm  06/21/18 15:30  





  Sodium Chloride 0.9% W/ 20 Meq Kcl/Liter  Administered  06/21/18 15:31  





  Dose   





  1,000 mls @ ud   





  IV   





  .STK-MED ONE   





     





     





     





     


 


Azithromycin  500 mg in 250 mls @ 250 mls/hr  06/21/18 16:25  06/21/18 17:11





  Zithromax 500 Mg/ 250 Ml Nacl Premix  IV  06/21/18 17:24  250 mls/hr





  STAT STA   250 mls/hr





     Administration





     





     





     


 


Azithromycin  Confirm  06/21/18 17:02  





  Zithromax 500 Mg/ 250 Ml Nacl Premix  Administered  06/21/18 17:03  





  Dose   





  500 mg in 250 mls @ ud   





  IV   





  .STK-MED ONE   





     





     





     





     


 


Miscellaneous Information  1 each  06/22/18 07:15  





  Medication Intervention    07/22/18 07:14  





  .RN TO CHECK WITH MD MCCLELLAN   





     





     





     





     


 


Ondansetron HCl  4 mg  06/21/18 14:40  06/21/18 14:51





  Zofran 4 Mg/2 Ml Vial**  IV  06/21/18 14:41  4 mg





  STAT ONE   Administration





     





     





     





     


 


Ondansetron HCl  Confirm  06/21/18 14:50  





  Zofran 4 Mg/2 Ml Vial**  Administered  06/21/18 14:51  





  Dose   





  4 mg   





  .ROUTE   





  .STK-MED ONE   





     





     





     





     


 


Pantoprazole Sodium  40 mg  06/21/18 14:40  06/21/18 14:51





  Protonix 40 Mg Iv***  IV  06/21/18 14:41  40 mg





  STAT ONE   Administration





     





     





     





     


 


Pantoprazole Sodium  Confirm  06/21/18 14:50  





  Protonix 40 Mg Iv***  Administered  06/21/18 14:51  





  Dose   





  40 mg   





  IV   





  .STK-MED ONE   





     





     





     





     


 


Pantoprazole Sodium  40 mg  06/22/18 10:00  06/23/18 10:54





  Protonix 40mg Tablet***  PO  07/22/18 09:59  40 mg





  DAILY ELEUTERIO   Administration





     





     





     





     


 


Pantoprazole Sodium  40 mg  06/23/18 16:30  06/23/18 19:49





  Protonix 40 Mg Iv***  IV  07/23/18 16:29  Not Given





  Q24H ELEUTERIO   





     





     





     





     








 Intake & Output (Last 24 hours)











 06/21/18 06/22/18 06/23/18 06/24/18





 11:59 11:59 11:59 11:59


 


Intake Total  1985 3826 919


 


Output Total  500 900 900


 


Balance  1485 2926 19


 


Weight  100 kg  








 Microbiology Results (Last 24 hours)





06/21/18 15:29   Urine, Catheterized   Urine Culture - Final


                            NO GROWTH





 Laboratory Results (Last 24 hours)











  06/23/18 06/23/18





  10:34 10:34


 


WBC   15.7 H


 


RBC   4.41


 


Hgb   13.2


 


Hct   38.9


 


MCV   88.2


 


MCH   29.9


 


MCHC   33.9


 


RDW   14.4 H


 


Plt Count   275


 


MPV   10.7 H


 


Sodium  136 L 


 


Potassium  4.0 


 


Chloride  104 


 


Carbon Dioxide  25 


 


Anion Gap  10.1 


 


BUN  9 


 


Creatinine  0.75 


 


Estimated GFR  > 60.0 


 


Glucose  152 H 


 


Calcium  7.5 L 


 


Total Bilirubin  0.80 


 


AST  67 H 


 


ALT  46 H 


 


Alkaline Phosphatase  130 H 


 


Serum Total Protein  5.2 L 


 


Albumin  2.5 L 








 Orders (Last 24 hours)











 Category Date Time Status


 


 CBC Urgent Lab  06/23/18 10:34 Completed


 


 CMP Urgent Lab  06/23/18 10:34 Completed


 


 Famotidine 20 mg Vial*** [Pepcid 20 MG VIAL***] Med  06/23/18 18:02 

Discontinued





 20 mg IV .STK-MED ONE   


 


 Famotidine 20 mg Vial*** [Pepcid 20 MG VIAL***] Med  06/23/18 19:38 Active





 20 mg IV Q12HT   


 


 Famotidine 20 mg Vial*** [Pepcid 20 MG VIAL***] Med  06/23/18 22:00 

Discontinued





 20 mg IV Q12HT   


 


 Pantoprazole 40 mg*** [Protonix 40 mg IV***] Med  06/23/18 16:30 Discontinued





 40 mg IV Q24H   


 


 Pantoprazole 40 mg*** [Protonix 40 mg IV***] Med  06/24/18 10:00 Active





 40 mg IV Q24H   








 Patient Care Notes (Last 24 hours)





06/23/18 16:40 (created 06/23/18 19:47) Nursing Note by Carri Lamar Dr. in house and made rounds earlier. New orders received. Pepcid 20mg 

IVP given at this time as ordered.





Initialized on 06/23/18 19:47 - END OF NOTE

















- Vitals & Intake/Output


Vital Signs: 





 Vital Signs











Temperature  98.7 F   06/24/18 07:04


 


Pulse Rate  86   06/24/18 07:15


 


Respiratory Rate  20   06/24/18 07:15


 


Blood Pressure  137/65   06/24/18 07:04


 


O2 Sat by Pulse Oximetry  95   06/24/18 07:15








 Oxygen-Last Documented











O2 Percentage                  2 Liters = 28%














Intake & Output: 





 Intake & Output











 06/21/18 06/22/18 06/23/18 06/24/18





 11:59 11:59 11:59 11:59


 


Intake Total  1985 3826 919


 


Output Total  500 900 900


 


Balance  1485 2926 19


 


Weight  100 kg  














- Lab


Result Diagrams: 


 06/23/18 10:34





 06/23/18 10:34


Lab Results-Last 24 Hrs: 





 Accuchecks











Date                           06/23/18


 


Date                           06/23/18


 


Date                           06/23/18


 


Time                           22:33


 


Time                           16:30


 


Time                           11:30


 


Accucheck Value:               93


 


Accucheck Value:               111


 


Accucheck Value:               122


 


Accucheck Value:               131











 Lab Results-Last 24 Hours











  06/23/18 06/23/18 Range/Units





  10:34 10:34 


 


WBC  15.7 H   (4.0-10.5)  K/mm3


 


RBC  4.41   (4.1-5.4)  M/mm3


 


Hgb  13.2   (12.0-16.0)  gm/dl


 


Hct  38.9   (35-47)  %


 


MCV  88.2   ()  fl


 


MCH  29.9   (26-32)  pg


 


MCHC  33.9   (32-36)  g/dl


 


RDW  14.4 H   (11.5-14.0)  %


 


Plt Count  275   (150-450)  K/mm3


 


MPV  10.7 H   (6-9.5)  fl


 


Sodium   136 L  (137-145)  mmol/L


 


Potassium   4.0  (3.5-5.1)  mmol/L


 


Chloride   104  ()  mmol/L


 


Carbon Dioxide   25  (22-30)  mmol/L


 


Anion Gap   10.1  (5-15)  MEQ/L


 


BUN   9  (7-17)  mg/dL


 


Creatinine   0.75  (0.52-1.04)  mg/dL


 


Estimated GFR   > 60.0  ML/MIN


 


Glucose   152 H  ()  mg/dL


 


Calcium   7.5 L  (8.4-10.2)  mg/dL


 


Total Bilirubin   0.80  (0.2-1.3)  mg/dL


 


AST   67 H  (14-36)  U/L


 


ALT   46 H  (0-35)  U/L


 


Alkaline Phosphatase   130 H  ()  U/L


 


Serum Total Protein   5.2 L  (6.3-8.2)  g/dL


 


Albumin   2.5 L  (3.5-5.0)  g/dL











Micro Results-Entire Visit: 





 Microbiology











 06/21/18 15:29 Urine Culture - Final





 Urine, Catheterized    NO GROWTH








 Accuchecks











Date                           06/23/18


 


Date                           06/23/18


 


Date                           06/23/18


 


Time                           22:33


 


Time                           16:30


 


Time                           11:30


 


Accucheck Value:               93


 


Accucheck Value:               111


 


Accucheck Value:               122


 


Accucheck Value:               131

















- Radiology Exams


Ordered Rad Exams-Entire Visit: 





 Radiology Procedures











 Category Date Time Status


 


 CHEST 1 VIEW (PORTABLE) Urgent Exams  06/22/18 10:11 Completed














- Procedures and Test


Procedures and Tests throughout Hospitalization: 





 Therapy Orders & Screens





06/21/18 19:00


Respiratory MDI BID 


   Comment: ADVAIR 115/21 2 PUFFS BID


   Diagnosis: abdominal pain, nausea vomiting


Respiratory Nebulizer Q6H 


   Comment: DUONEB Q6 HOURS


   Diagnosis: abdominal pain, nausea vomiting





06/21/18 19:37


Respiratory Therapy Consult ROUTINE 


   Comment: 


   Reason For Exam: 


   Diagnosis: abdominal pain, nausea vomiting





06/21/18 20:00


Oxygen NASAL CANNULA 2 lpm 


   Comment: 


   Diagnosis: abdominal pain, nausea vomiting





06/21/18 21:14


RT Screen per Nursing Assess ONCE 


   Comment: Protocol Order


   Physician Instructions: Greater than 3 points order RT Admission Screen


   Reason For Exam: Triggered on Admission


   Diagnosis: Pneumonia, hypokalemia, abd pain, vomiting, diarrhea


   Diagnosis: Pneumonia, hypokalemia, abd pain, vomiting, diarrhea


   Pneumonia: Yes


   Home O2: No


   Asthma: No


   CHF: Yes


   Home CPAP/BIPAP: No


   Home Nebs/MDI: No


   Total Points: 6


ST Screen per Nursing Assess 


   Comment: Protocol Order


   Physician Instructions: Greater than 5 points order ST Admission Screening


   Reason For Exam: Triggered on Admission


   Diagnosis: Pneumonia, hypokalemia, abd pain, vomiting, diarrhea


   CVA/Dyshpagia/Aphasia: No


   Cognitive Deficits: No


   Dehydration/Nutrition Deficit: No


   Reflux: Yes


   Oral-Motor Difficulties: No


   Pneumonia: Yes


   Nursing Home Resident: No


   Total Points: 8














Discharge Exam


General Appearance: no apparent distress, alert


Neurologic Exam: alert, oriented x 3, cooperative, normal mood/affect, nml 

cerebellar function, sensation nml, No motor deficits


Skin Exam: normal color, warm, dry


Eye Exam: PERRL, EOMI, eyes nml inspection


Ears, Nose, Throat Exam: normal ENT inspection, pharynx normal, moist mucous 

membranes


Neck Exam: normal inspection, non-tender, supple, full range of motion


Respiratory Exam: normal breath sounds, lungs clear, No respiratory distress


Cardiovascular Exam: regular rate/rhythm, normal heart sounds


Gastrointestinal/Abdomen Exam: soft, No tenderness, No mass


Extremity Exam: normal inspection, normal range of motion


Back Exam: normal inspection, normal range of motion, No CVA tenderness, No 

vertebral tenderness


Pelvic Exam: deferred


Rectal Exam: deferred





Final Diagnosis/Problem List





- Final Discharge Diagnosis/Problem


(1) Abdominal pain


Current Visit: Yes   Status: Resolved   Onset Date: ~06/22/18   





(2) Pneumonia


Current Visit: Yes   Status: Acute   Onset Date: ~06/22/18   


Assessment & Plan: 


improving, will keep her on abx for 2 more days








(3) Hypokalemia


Current Visit: Yes   Status: Resolved   Onset Date: ~06/22/18   





(4) Vomiting and diarrhea


Current Visit: Yes   Status: Resolved   Onset Date: ~06/22/18   





(5) Diabetes mellitus type 2


Current Visit: Yes   Status: Chronic   





(6) Hypertension


Current Visit: Yes   Status: Chronic   





(7) Lung cancer


Current Visit: Yes   Status: Acute   





- Discharge


Discharge Date: 06/24/18


Disposition: DC TO [facility]


Condition: Stable


Prescriptions: 


New


   Albuterol/Ipratropium 3ml Neb* [DUONEB 0.5-3 MG/3 ml Neb**] 3 ml IH Q6HRT  

ampul.neb





Continue


   Fluoxetine HCl 20 mg*** [Prozac 20 MG***] 20 mg PO DAILY


   Carvedilol 12.5 mg*** [Coreg 12.5 mg***] 12.5 mg PO BID


   Levothyroxine Sodium 25 Mcg*** [Synthroid 25 Mcg***] 25 mcg PO DAILY


   Lutein 10 mg PO DAILY


   Loratadine 10 mg*** [Claritin 10 mg***] 10 mg PO DAILY


   Insulin Detemir [Levemir] 35 unit SQ HS


   Potassium Chloride 10 Meq Tab* [Klor Con 10 MEQ***] 20 meq PO BID


   Omeprazole 20 MG [Prilosec 20 mg] 20 mg PO DAILY


   Amlodipine Besylate 5 mg*** [Norvasc 5 mg***] 2.5 mg PO DAILY


   Nitroglycerin 0.4 mg Tablet*** [Nitrostat 0.4 MG Tablet***] 0.4 mg SL UD


   Lisinopril 20 mg*** [Zestril 20 MG***] 10 mg PO BID


   Fluticasone Propionate [Flonase Allergy Relief] 2 spray NS DAILY


   Ondansetron [Zofran Odt] 4 mg PO Q6-8HPRN PRN #10 tab.rapdis


     PRN Reason: Nausea/Vomiting


   Morphine Sulfate 5 mg PO Q2H/PRN PRN


     PRN Reason: Pain


   Clindamycin HCl [Cleocin HCl] 150 mg PO QID


   Oxycodone HCl/Acetaminophen [Oxycodone-Acetaminophen 5-325] 1 each PO Q4HPRN 

PRN 15 Days #60


     PRN Reason: Pain


Additional Instructions: 


[facility] ORDERS:


PT/OT EVAL AND TREAT


UP TO CHAIR WITH MEALS


SEE ATTACHED MED LIST FOR CURRENT MED ORDERS


REGULAR DIET AS TOLERATED





Follow up with: 


NANCY BACA MD [Primary Care Provider] - 1 Week

## 2018-06-25 ENCOUNTER — HOSPITAL ENCOUNTER (EMERGENCY)
Dept: HOSPITAL 33 - ED | Age: 78
Discharge: NURSING, REHAB OR LONG-TERM CARE | End: 2018-06-25
Payer: MEDICARE

## 2018-06-25 VITALS — SYSTOLIC BLOOD PRESSURE: 141 MMHG | DIASTOLIC BLOOD PRESSURE: 86 MMHG

## 2018-06-25 VITALS — HEART RATE: 77 BPM | OXYGEN SATURATION: 95 %

## 2018-06-25 DIAGNOSIS — Z79.4: ICD-10-CM

## 2018-06-25 DIAGNOSIS — E11.649: Primary | ICD-10-CM

## 2018-06-25 DIAGNOSIS — Z85.118: ICD-10-CM

## 2018-06-25 PROCEDURE — 94640 AIRWAY INHALATION TREATMENT: CPT

## 2018-06-25 PROCEDURE — 99284 EMERGENCY DEPT VISIT MOD MDM: CPT

## 2018-06-25 NOTE — ERPHSYRPT
- History of Present Illness


Time Seen by Provider: 06/25/18 05:55


Source: patient, EMS


Physician History: 





PATIENT WITH A HISTORY OF STAGE 4 PULMONARY CARCINOMA, FOUND UNRESPONSIVE AT 

NURSING HOME WITH A GLUCOSE OF 27, EMS ADMINISTERED GLUCOGON, GLUCOSE IMPROVED 

TO 29, THEN INTRAVENOUS 1 AMP OF DEXTROSE 50, GLUCOSE . PATIENT IS A DNR 

AND WANTS TO BE LEFT ALONE. DENIES CHEST PAIN, DYSPNEA, NAUSEA OR EMESIS. 

ADMITS TO TAKING HER INSULIN WITHOUT EATING.


Severity: moderate


Character of Deficits: none


Baseline/Normal Cognition: alert oriented x 3


Current Cognition: alert oriented x 3


Baseline Gait: uses walker


Associated Symptoms: denies symptoms


Allergies/Adverse Reactions: 








doxycycline Allergy (Intermediate, Verified 06/21/18 14:33)


 Swelling


Iodinated Contrast- Oral and IV Dye Allergy (Intermediate, Verified 06/21/18 14:

33)


 Hives


 hives, rash, diff breathing 


belladonna alkaloids [From B & O 16-A Supprette] Allergy (Mild, Verified 06/21/ 18 14:33)


 CHILD ALLERGY


cephalexin monohydrate [From Keflex] Allergy (Mild, Verified 06/21/18 14:33)


 Hives


furosemide [From Lasix] Allergy (Mild, Verified 06/21/18 14:33)


 HEART ARTHYM


prochlorperazine edisylate [From Compazine] Allergy (Mild, Verified 06/21/18 14:

33)


 HALLUCINATIONS


prochlorperazine maleate [From Compazine] Allergy (Mild, Verified 06/21/18 14:33

)


 HALLUCINATIONS


Sulfa (Sulfonamide Antibiotics) [Sulfa(Sulfonamide Antibiotics)] Allergy (Mild, 

Verified 06/21/18 14:33)


 Hives


codeine [Codeine] Adverse Reaction (Mild, Verified 06/21/18 14:33)


 Headache


hydrocodone bitartrate [From Vicodin] Adverse Reaction (Mild, Verified 06/21/18 

14:33)


 Headache


ketorolac tromethamine [From Toradol] Adverse Reaction (Mild, Verified 06/21/18 

14:33)


 Vomiting


metformin HCl [From Glucophage] Adverse Reaction (Mild, Verified 06/21/18 14:33)


 Vomiting


naproxen [From Naprosyn] Adverse Reaction (Mild, Verified 06/21/18 14:33)


 Headache


opium *RETIRED-04/24/13 [From B & O 16-A Supprette] Adverse Reaction (Mild, 

Verified 06/21/18 14:33)


 DON'T LIKE THEM


phenytoin sodium [From Dilantin] Adverse Reaction (Mild, Verified 06/21/18 14:33

)


 FEEL DRUNK


phenytoin sodium extended [From Dilantin] Adverse Reaction (Mild, Verified 06/21 /18 14:33)


 FEEL DRUNK


Lactobacillus acidophilus [From Acidophilus] Adverse Reaction (Verified 06/21/ 18 14:33)


 


diuretics Allergy (Intermediate, Uncoded 06/21/18 14:33)


 HEART ARRHYTHMIAS


 pt states she is allergic to all diuretics. pt has cardiac arrhythmias 


influenza vaccine Adverse Reaction (Uncoded 06/21/18 14:33)


 





Home Medications: 








Amlodipine Besylate 5 mg*** [Norvasc 5 mg***] 2.5 mg PO DAILY 09/02/17 [History]


Carvedilol 12.5 mg*** [Coreg 12.5 mg***] 12.5 mg PO BID 09/02/17 [History]


Fluoxetine HCl 20 mg*** [Prozac 20 MG***] 20 mg PO DAILY 09/02/17 [History]


Insulin Detemir [Levemir] 35 unit SQ HS 09/02/17 [History]


Levothyroxine Sodium 25 Mcg*** [Synthroid 25 Mcg***] 25 mcg PO DAILY 09/02/17 [

History]


Loratadine 10 mg*** [Claritin 10 mg***] 10 mg PO DAILY 09/02/17 [History]


Lutein 10 mg PO DAILY 09/02/17 [History]


Omeprazole 20 MG [Prilosec 20 mg] 20 mg PO DAILY 09/02/17 [History]


Potassium Chloride 10 Meq Tab* [Klor Con 10 MEQ***] 20 meq PO BID 09/02/17 [

History]


Lisinopril 20 mg*** [Zestril 20 MG***] 10 mg PO BID 02/12/18 [History]


Nitroglycerin 0.4 mg Tablet*** [Nitrostat 0.4 MG Tablet***] 0.4 mg SL UD 02/12/ 18 [History]


Fluticasone Propionate [Flonase Allergy Relief] 2 spray NS DAILY 04/25/18 [

History]


Clindamycin HCl [Cleocin HCl] 150 mg PO QID 06/21/18 [History]


Morphine Sulfate 5 mg PO Q2H/PRN PRN 06/21/18 [History]





Hx Tetanus, Diphtheria Vaccination/Date Given: No


Hx Influenza Vaccination/Date Given: No


Hx Pneumococcal Vaccination/Date Given: Yes





- Review of Systems


Constitutional: No Fever, No Chills


Eyes: No Symptoms


Ears, Nose, & Throat: No Symptoms


Respiratory: No Cough, No Dyspnea


Cardiac: No Symptoms, No Chest Pain, No Edema, No Syncope


Abdominal/Gastrointestinal: No Abdominal Pain, No Nausea, No Vomiting, No 

Diarrhea


Genitourinary Symptoms: No Symptoms, No Dysuria


Musculoskeletal: No Symptoms, No Back Pain, No Neck Pain


Skin: No Rash


Neurological: No Symptoms, Other (FOUND UNRESPONSIVE BY EMS), No Dizziness, No 

Focal Weakness, No Sensory Changes


Psychological: No Symptoms


Endocrine: No Symptoms


All Other Systems: Reviewed and Negative





- Past Medical History


Pertinent Past Medical History: Yes


Neurological History: No Pertinent History


ENT History: Macular Degeneration


Cardiac History: Congestive Heart Failure, Hypertension


Respiratory History: Asthma, Bronchitis, COPD, Emphysema, Lung Cancer


Endocrine Medical History: Diabetes Type II, Hypothyroidism


Musculoskeletal History: Arthritis, Osteoarthritis


GI Medical History: GERD


 History: No Pertinent History


Psycho-Social History: Depression


Female Reproductive Disorders: Breast Cancer, Cervical Cancer


Other Medical History: CANCERS ALL IN REMISSION





- Past Surgical History


Past Surgical History: Yes


Neuro Surgical History: No Pertinent History


Cardiac: Cardiac Catheterization


Respiratory: Lobectomy


Gastrointestinal: Appendectomy, Cholecystectomy, Hemorrhoidectomy


Genitourinary: No Pertinent History


Musculoskeletal: Orthopedic Surgery


Female Surgical History: Hysterectomy, Tubal Ligation, Other


Other Surgical History: MASTECTOMY RT  BREAST,BREAST CA,LUNG RT MIDDLE LOBE 

REMOVED,NISSON FLUNDOPLASTY,KNEE BACL SURG, shoulder surgery





- Social History


Smoking Status: Former smoker


How long have you smoked: 5


Exposure to second hand smoke: No


Drug Use: none


Patient Lives Alone: Yes





- Nursing Vital Signs


Nursing Vital Signs: 


 Initial Vital Signs











Pulse Rate  76   06/25/18 05:38


 


Respiratory Rate  23   06/25/18 05:38


 


Blood Pressure  154/74   06/25/18 05:38


 


O2 Sat by Pulse Oximetry  92 L  06/25/18 05:38








 Pain Scale











Pain Intensity                 0

















- [place] Coma Scale


Best Eye Response ([place]): (4) open spontaneously


Best Verbal Response ([place]): (5) oriented


Best Motor Response ([place]): (6) obeys commands


[place] Total: 15





- Physical Exam


General Appearance: no apparent distress, alert, other (ARRIVES TO EMERGENCY 

VIA EMS ALERT AND APPROPRIATE)


Eye Exam: bilateral eye: PERRL, EOMI


Ears, Nose, Throat Exam: normal ENT inspection, moist mucous membranes


Neck Exam: normal inspection, non-tender, supple


Respiratory: normal breath sounds, lungs clear, airway intact, No respiratory 

distress


Cardiovascular: regular rate/rhythm, No edema


Gastrointestinal: soft, No tenderness, No distention


Back Exam: normal inspection


Extremity Exam: normal inspection, No pedal edema


Mental Status: alert, oriented x 3


CNs Exam: tongue midline


Coordination/Gait: normal finger to nose, normal gait


Skin Exam: normal color, warm, dry, No rash


Ordered Tests: 


 Active Orders 24 hr











 Category Date Time Status


 


 EKG-ER Only STAT Care  06/25/18 05:58 Active


 


 Oxygen-ED Only NASAL CANNULA 2 lpm Care  06/25/18 05:55 Active








Medication Summary











Generic Name Dose Route Start Last Admin





  Trade Name Freq  PRN Reason Stop Dose Admin


 


Dextrose/Sodium Chloride  500 mls @ 250 mls/hr  06/25/18 06:00  





  Dextrose 5%-Normal Saline 500 Ml  IV  07/25/18 05:59  





  .Q2H ELEUTERIO   





     





     





     





     














Discontinued Medications














Generic Name Dose Route Start Last Admin





  Trade Name Freq  PRN Reason Stop Dose Admin


 


Dextrose/Sodium Chloride  Confirm  06/25/18 05:50  





  Dextrose 5%-Ns Iv Solution 1000 Ml  Administered  06/25/18 05:51  





  Dose   





  1,000 mls @ ud   





  IV   





  .STK-MED ONE   





     





     





     





     














- Progress


Progress Note: 





06/25/18 07:07


ACCUCHECK 107,  PATIENT LETHARGIC GIVEN ADDITIONAL DOSE 1 AMP D-50, PATIENT 

ALERT AND REFUSES TREATMENT, REFUSES BLOOD DRAW. REFUSES ADDITIONAL DOSE OF D-50

,  GIVEN IV D-5 NS AT 250ML/HR  ACCUCHECK AT 1907  


Counseled pt/family regarding: lab results, diagnosis, need for follow-up





- Departure


Time of Disposition: 07:14


Departure Disposition: Home


Clinical Impression: 


 HYPOGLYCEMIA





Condition: Stable


Critical Care Time: No


Referrals: 


NANCY BACA MD [Primary Care Provider] - 


Additional Instructions: 


GIVE INSULIN ONLY IF TAKING MEALS, AND FOLLOW FREQUENT GLUCOSE CHECKS. CONSULT 

YOUR PRIMARY CARE PROVIDER IN 3-5 DAYS